# Patient Record
Sex: FEMALE | Race: OTHER | HISPANIC OR LATINO
[De-identification: names, ages, dates, MRNs, and addresses within clinical notes are randomized per-mention and may not be internally consistent; named-entity substitution may affect disease eponyms.]

---

## 2019-02-04 PROBLEM — Z00.00 ENCOUNTER FOR PREVENTIVE HEALTH EXAMINATION: Status: ACTIVE | Noted: 2019-02-04

## 2019-02-09 ENCOUNTER — APPOINTMENT (OUTPATIENT)
Dept: CT IMAGING | Facility: IMAGING CENTER | Age: 39
End: 2019-02-09
Payer: COMMERCIAL

## 2019-02-09 ENCOUNTER — OUTPATIENT (OUTPATIENT)
Dept: OUTPATIENT SERVICES | Facility: HOSPITAL | Age: 39
LOS: 1 days | End: 2019-02-09
Payer: COMMERCIAL

## 2019-02-09 DIAGNOSIS — Z00.8 ENCOUNTER FOR OTHER GENERAL EXAMINATION: ICD-10-CM

## 2019-02-09 PROCEDURE — 70486 CT MAXILLOFACIAL W/O DYE: CPT | Mod: 26

## 2019-02-09 PROCEDURE — 70486 CT MAXILLOFACIAL W/O DYE: CPT

## 2019-03-25 VITALS
RESPIRATION RATE: 19 BRPM | HEART RATE: 68 BPM | OXYGEN SATURATION: 91 % | HEIGHT: 61 IN | DIASTOLIC BLOOD PRESSURE: 70 MMHG | WEIGHT: 124 LBS | SYSTOLIC BLOOD PRESSURE: 120 MMHG | TEMPERATURE: 97.7 F | BODY MASS INDEX: 23.41 KG/M2

## 2020-10-01 DIAGNOSIS — Z01.818 ENCOUNTER FOR OTHER PREPROCEDURAL EXAMINATION: ICD-10-CM

## 2020-10-02 ENCOUNTER — APPOINTMENT (OUTPATIENT)
Dept: DISASTER EMERGENCY | Facility: CLINIC | Age: 40
End: 2020-10-02

## 2020-10-03 LAB — SARS-COV-2 N GENE NPH QL NAA+PROBE: NOT DETECTED

## 2020-10-04 ENCOUNTER — TRANSCRIPTION ENCOUNTER (OUTPATIENT)
Age: 40
End: 2020-10-04

## 2020-10-05 ENCOUNTER — OUTPATIENT (OUTPATIENT)
Dept: OUTPATIENT SERVICES | Facility: HOSPITAL | Age: 40
LOS: 1 days | Discharge: HOME | End: 2020-10-05

## 2020-10-06 ENCOUNTER — TRANSCRIPTION ENCOUNTER (OUTPATIENT)
Age: 40
End: 2020-10-06

## 2022-04-30 ENCOUNTER — INPATIENT (INPATIENT)
Facility: HOSPITAL | Age: 42
LOS: 2 days | Discharge: HOME | DRG: 661 | End: 2022-05-03
Attending: UROLOGY | Admitting: UROLOGY
Payer: COMMERCIAL

## 2022-04-30 VITALS
HEART RATE: 72 BPM | RESPIRATION RATE: 24 BRPM | OXYGEN SATURATION: 100 % | WEIGHT: 130.51 LBS | SYSTOLIC BLOOD PRESSURE: 158 MMHG | DIASTOLIC BLOOD PRESSURE: 93 MMHG

## 2022-04-30 DIAGNOSIS — N20.1 CALCULUS OF URETER: ICD-10-CM

## 2022-04-30 DIAGNOSIS — N20.0 CALCULUS OF KIDNEY: ICD-10-CM

## 2022-04-30 LAB
ALBUMIN SERPL ELPH-MCNC: 3.5 G/DL — SIGNIFICANT CHANGE UP (ref 3.5–5)
ALP SERPL-CCNC: 52 U/L — SIGNIFICANT CHANGE UP (ref 40–120)
ALT FLD-CCNC: 20 U/L DA — SIGNIFICANT CHANGE UP (ref 10–60)
ANION GAP SERPL CALC-SCNC: 9 MMOL/L — SIGNIFICANT CHANGE UP (ref 5–17)
APPEARANCE UR: CLEAR — SIGNIFICANT CHANGE UP
AST SERPL-CCNC: 14 U/L — SIGNIFICANT CHANGE UP (ref 10–40)
BACTERIA # UR AUTO: ABNORMAL /HPF
BASOPHILS # BLD AUTO: 0.05 K/UL — SIGNIFICANT CHANGE UP (ref 0–0.2)
BASOPHILS NFR BLD AUTO: 0.5 % — SIGNIFICANT CHANGE UP (ref 0–2)
BILIRUB SERPL-MCNC: 0.3 MG/DL — SIGNIFICANT CHANGE UP (ref 0.2–1.2)
BILIRUB UR-MCNC: NEGATIVE — SIGNIFICANT CHANGE UP
BUN SERPL-MCNC: 10 MG/DL — SIGNIFICANT CHANGE UP (ref 7–18)
CALCIUM SERPL-MCNC: 9.2 MG/DL — SIGNIFICANT CHANGE UP (ref 8.4–10.5)
CHLORIDE SERPL-SCNC: 109 MMOL/L — HIGH (ref 96–108)
CO2 SERPL-SCNC: 23 MMOL/L — SIGNIFICANT CHANGE UP (ref 22–31)
COLOR SPEC: YELLOW — SIGNIFICANT CHANGE UP
COMMENT - URINE: SIGNIFICANT CHANGE UP
CREAT SERPL-MCNC: 0.87 MG/DL — SIGNIFICANT CHANGE UP (ref 0.5–1.3)
DIFF PNL FLD: ABNORMAL
EGFR: 85 ML/MIN/1.73M2 — SIGNIFICANT CHANGE UP
EOSINOPHIL # BLD AUTO: 0.08 K/UL — SIGNIFICANT CHANGE UP (ref 0–0.5)
EOSINOPHIL NFR BLD AUTO: 0.8 % — SIGNIFICANT CHANGE UP (ref 0–6)
EPI CELLS # UR: SIGNIFICANT CHANGE UP /HPF
GLUCOSE SERPL-MCNC: 126 MG/DL — HIGH (ref 70–99)
GLUCOSE UR QL: NEGATIVE — SIGNIFICANT CHANGE UP
GRAN CASTS # UR COMP ASSIST: ABNORMAL /LPF
HCG SERPL-ACNC: <1 MIU/ML — SIGNIFICANT CHANGE UP
HCT VFR BLD CALC: 36.6 % — SIGNIFICANT CHANGE UP (ref 34.5–45)
HGB BLD-MCNC: 12.4 G/DL — SIGNIFICANT CHANGE UP (ref 11.5–15.5)
IMM GRANULOCYTES NFR BLD AUTO: 0.4 % — SIGNIFICANT CHANGE UP (ref 0–1.5)
KETONES UR-MCNC: ABNORMAL
LACTATE SERPL-SCNC: 3.3 MMOL/L — HIGH (ref 0.7–2)
LEUKOCYTE ESTERASE UR-ACNC: NEGATIVE — SIGNIFICANT CHANGE UP
LIDOCAIN IGE QN: 111 U/L — SIGNIFICANT CHANGE UP (ref 73–393)
LYMPHOCYTES # BLD AUTO: 1.64 K/UL — SIGNIFICANT CHANGE UP (ref 1–3.3)
LYMPHOCYTES # BLD AUTO: 16.6 % — SIGNIFICANT CHANGE UP (ref 13–44)
MCHC RBC-ENTMCNC: 29.9 PG — SIGNIFICANT CHANGE UP (ref 27–34)
MCHC RBC-ENTMCNC: 33.9 GM/DL — SIGNIFICANT CHANGE UP (ref 32–36)
MCV RBC AUTO: 88.2 FL — SIGNIFICANT CHANGE UP (ref 80–100)
MONOCYTES # BLD AUTO: 0.54 K/UL — SIGNIFICANT CHANGE UP (ref 0–0.9)
MONOCYTES NFR BLD AUTO: 5.5 % — SIGNIFICANT CHANGE UP (ref 2–14)
NEUTROPHILS # BLD AUTO: 7.54 K/UL — HIGH (ref 1.8–7.4)
NEUTROPHILS NFR BLD AUTO: 76.2 % — SIGNIFICANT CHANGE UP (ref 43–77)
NITRITE UR-MCNC: NEGATIVE — SIGNIFICANT CHANGE UP
NRBC # BLD: 0 /100 WBCS — SIGNIFICANT CHANGE UP (ref 0–0)
PH UR: 8 — SIGNIFICANT CHANGE UP (ref 5–8)
PLATELET # BLD AUTO: 258 K/UL — SIGNIFICANT CHANGE UP (ref 150–400)
POTASSIUM SERPL-MCNC: 3.2 MMOL/L — LOW (ref 3.5–5.3)
POTASSIUM SERPL-SCNC: 3.2 MMOL/L — LOW (ref 3.5–5.3)
PROT SERPL-MCNC: 7.3 G/DL — SIGNIFICANT CHANGE UP (ref 6–8.3)
PROT UR-MCNC: NEGATIVE — SIGNIFICANT CHANGE UP
RBC # BLD: 4.15 M/UL — SIGNIFICANT CHANGE UP (ref 3.8–5.2)
RBC # FLD: 12.1 % — SIGNIFICANT CHANGE UP (ref 10.3–14.5)
RBC CASTS # UR COMP ASSIST: ABNORMAL /HPF (ref 0–2)
SARS-COV-2 RNA SPEC QL NAA+PROBE: SIGNIFICANT CHANGE UP
SODIUM SERPL-SCNC: 141 MMOL/L — SIGNIFICANT CHANGE UP (ref 135–145)
SP GR SPEC: 1.01 — SIGNIFICANT CHANGE UP (ref 1.01–1.02)
UROBILINOGEN FLD QL: NEGATIVE — SIGNIFICANT CHANGE UP
WBC # BLD: 9.89 K/UL — SIGNIFICANT CHANGE UP (ref 3.8–10.5)
WBC # FLD AUTO: 9.89 K/UL — SIGNIFICANT CHANGE UP (ref 3.8–10.5)
WBC UR QL: SIGNIFICANT CHANGE UP /HPF (ref 0–5)

## 2022-04-30 PROCEDURE — 99221 1ST HOSP IP/OBS SF/LOW 40: CPT | Mod: GC

## 2022-04-30 PROCEDURE — 76830 TRANSVAGINAL US NON-OB: CPT | Mod: 26

## 2022-04-30 PROCEDURE — 93975 VASCULAR STUDY: CPT | Mod: 26

## 2022-04-30 PROCEDURE — 76856 US EXAM PELVIC COMPLETE: CPT | Mod: 26

## 2022-04-30 PROCEDURE — 99284 EMERGENCY DEPT VISIT MOD MDM: CPT

## 2022-04-30 PROCEDURE — 74176 CT ABD & PELVIS W/O CONTRAST: CPT | Mod: 26,MA

## 2022-04-30 RX ORDER — POTASSIUM CHLORIDE 20 MEQ
10 PACKET (EA) ORAL ONCE
Refills: 0 | Status: COMPLETED | OUTPATIENT
Start: 2022-04-30 | End: 2022-04-30

## 2022-04-30 RX ORDER — SODIUM CHLORIDE 9 MG/ML
3 INJECTION INTRAMUSCULAR; INTRAVENOUS; SUBCUTANEOUS EVERY 8 HOURS
Refills: 0 | Status: DISCONTINUED | OUTPATIENT
Start: 2022-04-30 | End: 2022-05-03

## 2022-04-30 RX ORDER — SODIUM CHLORIDE 9 MG/ML
1000 INJECTION INTRAMUSCULAR; INTRAVENOUS; SUBCUTANEOUS ONCE
Refills: 0 | Status: COMPLETED | OUTPATIENT
Start: 2022-04-30 | End: 2022-04-30

## 2022-04-30 RX ORDER — DEXTROSE MONOHYDRATE, SODIUM CHLORIDE, AND POTASSIUM CHLORIDE 50; .745; 4.5 G/1000ML; G/1000ML; G/1000ML
1000 INJECTION, SOLUTION INTRAVENOUS
Refills: 0 | Status: DISCONTINUED | OUTPATIENT
Start: 2022-04-30 | End: 2022-05-03

## 2022-04-30 RX ORDER — MORPHINE SULFATE 50 MG/1
4 CAPSULE, EXTENDED RELEASE ORAL ONCE
Refills: 0 | Status: DISCONTINUED | OUTPATIENT
Start: 2022-04-30 | End: 2022-04-30

## 2022-04-30 RX ORDER — ONDANSETRON 8 MG/1
4 TABLET, FILM COATED ORAL EVERY 6 HOURS
Refills: 0 | Status: DISCONTINUED | OUTPATIENT
Start: 2022-04-30 | End: 2022-05-03

## 2022-04-30 RX ORDER — ONDANSETRON 8 MG/1
4 TABLET, FILM COATED ORAL ONCE
Refills: 0 | Status: COMPLETED | OUTPATIENT
Start: 2022-04-30 | End: 2022-04-30

## 2022-04-30 RX ORDER — KETOROLAC TROMETHAMINE 30 MG/ML
30 SYRINGE (ML) INJECTION EVERY 6 HOURS
Refills: 0 | Status: DISCONTINUED | OUTPATIENT
Start: 2022-04-30 | End: 2022-05-01

## 2022-04-30 RX ORDER — KETOROLAC TROMETHAMINE 30 MG/ML
15 SYRINGE (ML) INJECTION ONCE
Refills: 0 | Status: DISCONTINUED | OUTPATIENT
Start: 2022-04-30 | End: 2022-04-30

## 2022-04-30 RX ORDER — ACETAMINOPHEN 500 MG
1000 TABLET ORAL ONCE
Refills: 0 | Status: COMPLETED | OUTPATIENT
Start: 2022-04-30 | End: 2022-04-30

## 2022-04-30 RX ORDER — TAMSULOSIN HYDROCHLORIDE 0.4 MG/1
0.4 CAPSULE ORAL ONCE
Refills: 0 | Status: COMPLETED | OUTPATIENT
Start: 2022-04-30 | End: 2022-04-30

## 2022-04-30 RX ORDER — TAMSULOSIN HYDROCHLORIDE 0.4 MG/1
0.4 CAPSULE ORAL AT BEDTIME
Refills: 0 | Status: DISCONTINUED | OUTPATIENT
Start: 2022-05-01 | End: 2022-05-03

## 2022-04-30 RX ADMIN — TAMSULOSIN HYDROCHLORIDE 0.4 MILLIGRAM(S): 0.4 CAPSULE ORAL at 11:38

## 2022-04-30 RX ADMIN — Medication 400 MILLIGRAM(S): at 11:39

## 2022-04-30 RX ADMIN — SODIUM CHLORIDE 1000 MILLILITER(S): 9 INJECTION INTRAMUSCULAR; INTRAVENOUS; SUBCUTANEOUS at 05:32

## 2022-04-30 RX ADMIN — Medication 15 MILLIGRAM(S): at 05:12

## 2022-04-30 RX ADMIN — MORPHINE SULFATE 4 MILLIGRAM(S): 50 CAPSULE, EXTENDED RELEASE ORAL at 07:00

## 2022-04-30 RX ADMIN — MORPHINE SULFATE 4 MILLIGRAM(S): 50 CAPSULE, EXTENDED RELEASE ORAL at 05:32

## 2022-04-30 RX ADMIN — Medication 10 MILLIEQUIVALENT(S): at 07:12

## 2022-04-30 RX ADMIN — MORPHINE SULFATE 4 MILLIGRAM(S): 50 CAPSULE, EXTENDED RELEASE ORAL at 09:29

## 2022-04-30 RX ADMIN — Medication 30 MILLIGRAM(S): at 17:41

## 2022-04-30 RX ADMIN — MORPHINE SULFATE 4 MILLIGRAM(S): 50 CAPSULE, EXTENDED RELEASE ORAL at 08:59

## 2022-04-30 RX ADMIN — Medication 15 MILLIGRAM(S): at 04:55

## 2022-04-30 RX ADMIN — SODIUM CHLORIDE 3 MILLILITER(S): 9 INJECTION INTRAMUSCULAR; INTRAVENOUS; SUBCUTANEOUS at 21:10

## 2022-04-30 RX ADMIN — ONDANSETRON 4 MILLIGRAM(S): 8 TABLET, FILM COATED ORAL at 05:32

## 2022-04-30 RX ADMIN — MORPHINE SULFATE 4 MILLIGRAM(S): 50 CAPSULE, EXTENDED RELEASE ORAL at 06:12

## 2022-04-30 RX ADMIN — SODIUM CHLORIDE 1000 MILLILITER(S): 9 INJECTION INTRAMUSCULAR; INTRAVENOUS; SUBCUTANEOUS at 04:55

## 2022-04-30 RX ADMIN — DEXTROSE MONOHYDRATE, SODIUM CHLORIDE, AND POTASSIUM CHLORIDE 125 MILLILITER(S): 50; .745; 4.5 INJECTION, SOLUTION INTRAVENOUS at 16:28

## 2022-04-30 RX ADMIN — Medication 1000 MILLIGRAM(S): at 21:12

## 2022-04-30 RX ADMIN — Medication 30 MILLIGRAM(S): at 17:11

## 2022-04-30 RX ADMIN — Medication 1000 MILLIGRAM(S): at 00:00

## 2022-04-30 RX ADMIN — MORPHINE SULFATE 4 MILLIGRAM(S): 50 CAPSULE, EXTENDED RELEASE ORAL at 05:44

## 2022-04-30 RX ADMIN — Medication 100 MILLIEQUIVALENT(S): at 06:12

## 2022-04-30 RX ADMIN — ONDANSETRON 4 MILLIGRAM(S): 8 TABLET, FILM COATED ORAL at 20:33

## 2022-04-30 RX ADMIN — Medication 400 MILLIGRAM(S): at 20:37

## 2022-04-30 RX ADMIN — SODIUM CHLORIDE 3 MILLILITER(S): 9 INJECTION INTRAMUSCULAR; INTRAVENOUS; SUBCUTANEOUS at 05:32

## 2022-04-30 RX ADMIN — SODIUM CHLORIDE 3 MILLILITER(S): 9 INJECTION INTRAMUSCULAR; INTRAVENOUS; SUBCUTANEOUS at 14:19

## 2022-04-30 RX ADMIN — DEXTROSE MONOHYDRATE, SODIUM CHLORIDE, AND POTASSIUM CHLORIDE 125 MILLILITER(S): 50; .745; 4.5 INJECTION, SOLUTION INTRAVENOUS at 17:14

## 2022-04-30 NOTE — H&P ADULT - ASSESSMENT
40 y/o female denies sig PMH or PSH  c/o LLQ pain radiating to L flank since yesterday  denies f/c/n/v  pain worsened overnight  urinating without difficulty    CT with L mid ureteral 7mm stone with L perinephric fluid, mod hydro  pt urinating freely  labs wnl  pain still present  no fever or leukocytosis, HD stable

## 2022-04-30 NOTE — ED PROVIDER NOTE - PROGRESS NOTE DETAILS
Vick - SO received from Dr Mckeon. Pt evaluated at bedside, pain is recurring to LLQ, no TTP but positive L CVAT. UA w hematuria (pt not on her period). US not reported but likely kidney stone. Will get CT non-con, will redose pain meds, likely Urology consult if stx persist Nemes - Rads called w 7mm mid ureter stone w hydronephrosis and hydroureter, perinephric stranding and fluid. Also, incidental findings of L adnexal dermoid cyst and liver mass vs hemangioma. Pt aware of CT findings. D/w Dr Howard, will see pt at bedside shortly Vick - Dr Howard in the ED, accepted patient for Dr Sher's service. Copy of CT report given to patient, incidental findings reviewed

## 2022-04-30 NOTE — H&P ADULT - NSHPPHYSICALEXAM_GEN_ALL_CORE
T(C): 36.7 (04-30-22 @ 07:36), Max: 36.7 (04-30-22 @ 07:36)  HR: 66 (04-30-22 @ 07:36) (66 - 72)  BP: 108/69 (04-30-22 @ 07:36) (108/69 - 158/93)  RR: 19 (04-30-22 @ 07:36) (19 - 24)  SpO2: 100% (04-30-22 @ 07:36) (100% - 100%)    CONSTITUTIONAL: Well groomed, no apparent distress  EYES: PERRLA and symmetric, EOMI, No conjunctival or scleral injection, non-icteric  NECK: Supple, symmetric and without tracheal deviation  RESPIRATORY: No respiratory distress, no use of accessory muscles; CTA b/l, no wheezes, rales or rhonchi  CARDIOVASCULAR: RRRR, +S1S2, no murmurs, no rubs, no gallops  GASTROINTESTINAL: Soft, mildly tender LLQ, non distended, no rebound, no guarding; mild L CVA tenderness  MUSCULOSKELETAL: Normal gait and station; no digital clubbing or cyanosis  SKIN: No rashes or ulcers noted; no subcutaneous nodules or induration palpable  PSYCHIATRIC: Appropriate insight/judgment; A+O x 3, mood and affect appropriate, recent/remote memory intact

## 2022-04-30 NOTE — ED PROVIDER NOTE - CLINICAL SUMMARY MEDICAL DECISION MAKING FREE TEXT BOX
Pt p/w sudden onset severe L lower ABD/pelvic pain with N/V. Must r/o torsion, U/S team called in STAT. Labs showing lactate 3.3. Pt currently in U/S. Pt stable. Will reassess.

## 2022-04-30 NOTE — H&P ADULT - PROBLEM SELECTOR PLAN 1
will admit, hydrate, pain control, flomax, serial abd exams, poss cysto/stent if no improvement, am labs, npo p mn  discussed with Dr Sher

## 2022-04-30 NOTE — H&P ADULT - HISTORY OF PRESENT ILLNESS
40 y/o female denies sig PMH or PSH  c/o LLQ pain radiating to L flank since yesterday  denies f/c/n/v  pain worsened overnight  urinating without difficulty    < from: CT Abdomen and Pelvis No Cont (04.30.22 @ 09:49) >  FINDINGS:  LOWER CHEST: Bibasilar linear atelectasis.    LIVER: Indeterminate hypodense mass in the left lobe measuring 4.3 x 3.9   cm (2:24). Additional subcentimeter hypodense foci are noted in the right   lobe.  BILE DUCTS: Normal caliber.  GALLBLADDER: Within normal limits.  SPLEEN: Within normal limits.  PANCREAS: Within normal limits.  ADRENALS: Within normal limits.  KIDNEYS/URETERS: Moderate left hydroureteronephrosis, secondary to a 0.7   x 0.5 cm calculus in the mid left ureter. There is moderate associated   left perinephric fluid.    BLADDER: Within normal limits.  REPRODUCTIVE ORGANS: A 3.4 x 3.3 cm left adnexal dermoid is present   (2:103). There is an associated calcification with the mass.    BOWEL: No bowel obstruction. Appendix is within normal limits.  PERITONEUM: Trace ascites.  VESSELS: Within normal limits.  RETROPERITONEUM/LYMPH NODES: No lymphadenopathy.  ABDOMINALWALL: Small fat containing umbilical hernia.  BONES: Within normal limits.    IMPRESSION:    Moderate left hydroureteronephrosis, secondary to a 0.7 x 0.5 cm calculus   in the mid left ureter. There is moderate associated left perinephric   fluid.    Indeterminate 4.3 x 3.9 cm hypodense mass in the left lobe of the liver.   Contrast-enhanced MRI is suggested for further characterization.    A 3.4 x 3.3 cm left adnexal dermoid is seen.    < end of copied text >

## 2022-04-30 NOTE — H&P ADULT - ATTENDING COMMENTS
42 y/o female denies sig PMH or PSH  c/o LLQ pain radiating to L flank since yesterday  denies f/c/n/v  pain worsened overnight  urinating without difficulty    CT with L mid ureteral 7mm stone with L perinephric fluid, mod hydro  pt urinating freely  labs wnl  pain still present  no fever or leukocytosis, HD stable    will admit, hydrate, pain control, flomax, serial abd exams, poss cysto/stent if no improvement, am labs, npo p mn

## 2022-04-30 NOTE — ED PROVIDER NOTE - PHYSICAL EXAMINATION
Vital Signs Reviewed  GEN: Very uncomfortable, AAOx3  HEENT: NCAT, MMM, Neck Supple  RESP: CTAB, No rales/rhonchi/wheezing  CV: RRR, S1S2, No murmurs  ABD: LLQ and L pelvic TTP, Soft, ND, No masses, No CVA Tenderness  Extrem/Skin: Equal pulses bilat, No cyanosis/edema/rashes  Neuro: No focal deficits

## 2022-04-30 NOTE — PATIENT PROFILE ADULT - FALL HARM RISK - UNIVERSAL INTERVENTIONS
Bed in lowest position, wheels locked, appropriate side rails in place/Call bell, personal items and telephone in reach/Instruct patient to call for assistance before getting out of bed or chair/Non-slip footwear when patient is out of bed/[place] to call system/Physically safe environment - no spills, clutter or unnecessary equipment/Purposeful Proactive Rounding/Room/bathroom lighting operational, light cord in reach

## 2022-04-30 NOTE — ED PROVIDER NOTE - OBJECTIVE STATEMENT
42 yo F h/o ovarian cyst (unknown side), on OCPs, no other PMH, no ABD surg p/w severe LLQ and pelvic pain with N/V x several hours. Pt states that she's had 1 week of mild LLQ pain with constipation, had nml BM yesterday, and then woke up at 2am with severe LLQ/L pelvic pain. Pt denies associated Vaginal Bleeding or Discharge, Bloody or Black Stools, Diarrhea, Dysuria or other Urinary symptoms, Fever, Syncope, Chest Pain, SOB, Focal Numbness/Weakness. No h/o ABD Surgeries/ Hernias/Ulcers. No other recent illness/ hospitalizations.

## 2022-05-01 ENCOUNTER — TRANSCRIPTION ENCOUNTER (OUTPATIENT)
Age: 42
End: 2022-05-01

## 2022-05-01 LAB
ANION GAP SERPL CALC-SCNC: 7 MMOL/L — SIGNIFICANT CHANGE UP (ref 5–17)
BUN SERPL-MCNC: 5 MG/DL — LOW (ref 7–18)
CALCIUM SERPL-MCNC: 8.4 MG/DL — SIGNIFICANT CHANGE UP (ref 8.4–10.5)
CHLORIDE SERPL-SCNC: 111 MMOL/L — HIGH (ref 96–108)
CO2 SERPL-SCNC: 24 MMOL/L — SIGNIFICANT CHANGE UP (ref 22–31)
CREAT SERPL-MCNC: 0.8 MG/DL — SIGNIFICANT CHANGE UP (ref 0.5–1.3)
EGFR: 94 ML/MIN/1.73M2 — SIGNIFICANT CHANGE UP
GLUCOSE SERPL-MCNC: 104 MG/DL — HIGH (ref 70–99)
HCT VFR BLD CALC: 31.6 % — LOW (ref 34.5–45)
HGB BLD-MCNC: 10.4 G/DL — LOW (ref 11.5–15.5)
MCHC RBC-ENTMCNC: 30.2 PG — SIGNIFICANT CHANGE UP (ref 27–34)
MCHC RBC-ENTMCNC: 32.9 GM/DL — SIGNIFICANT CHANGE UP (ref 32–36)
MCV RBC AUTO: 91.9 FL — SIGNIFICANT CHANGE UP (ref 80–100)
NRBC # BLD: 0 /100 WBCS — SIGNIFICANT CHANGE UP (ref 0–0)
PLATELET # BLD AUTO: 169 K/UL — SIGNIFICANT CHANGE UP (ref 150–400)
POTASSIUM SERPL-MCNC: 3.6 MMOL/L — SIGNIFICANT CHANGE UP (ref 3.5–5.3)
POTASSIUM SERPL-SCNC: 3.6 MMOL/L — SIGNIFICANT CHANGE UP (ref 3.5–5.3)
RBC # BLD: 3.44 M/UL — LOW (ref 3.8–5.2)
RBC # FLD: 12.5 % — SIGNIFICANT CHANGE UP (ref 10.3–14.5)
SODIUM SERPL-SCNC: 142 MMOL/L — SIGNIFICANT CHANGE UP (ref 135–145)
WBC # BLD: 6.46 K/UL — SIGNIFICANT CHANGE UP (ref 3.8–10.5)
WBC # FLD AUTO: 6.46 K/UL — SIGNIFICANT CHANGE UP (ref 3.8–10.5)

## 2022-05-01 RX ORDER — TAMSULOSIN HYDROCHLORIDE 0.4 MG/1
0.4 CAPSULE ORAL ONCE
Refills: 0 | Status: COMPLETED | OUTPATIENT
Start: 2022-05-01 | End: 2022-05-01

## 2022-05-01 RX ORDER — KETOROLAC TROMETHAMINE 30 MG/ML
15 SYRINGE (ML) INJECTION EVERY 6 HOURS
Refills: 0 | Status: DISCONTINUED | OUTPATIENT
Start: 2022-05-01 | End: 2022-05-03

## 2022-05-01 RX ORDER — SENNA PLUS 8.6 MG/1
2 TABLET ORAL AT BEDTIME
Refills: 0 | Status: DISCONTINUED | OUTPATIENT
Start: 2022-05-01 | End: 2022-05-03

## 2022-05-01 RX ORDER — CEFTRIAXONE 500 MG/1
1000 INJECTION, POWDER, FOR SOLUTION INTRAMUSCULAR; INTRAVENOUS ONCE
Refills: 0 | Status: COMPLETED | OUTPATIENT
Start: 2022-05-01 | End: 2022-05-01

## 2022-05-01 RX ORDER — POLYETHYLENE GLYCOL 3350 17 G/17G
17 POWDER, FOR SOLUTION ORAL ONCE
Refills: 0 | Status: COMPLETED | OUTPATIENT
Start: 2022-05-01 | End: 2022-05-02

## 2022-05-01 RX ORDER — CEFTRIAXONE 500 MG/1
INJECTION, POWDER, FOR SOLUTION INTRAMUSCULAR; INTRAVENOUS
Refills: 0 | Status: DISCONTINUED | OUTPATIENT
Start: 2022-05-01 | End: 2022-05-03

## 2022-05-01 RX ORDER — CEFTRIAXONE 500 MG/1
1000 INJECTION, POWDER, FOR SOLUTION INTRAMUSCULAR; INTRAVENOUS EVERY 24 HOURS
Refills: 0 | Status: DISCONTINUED | OUTPATIENT
Start: 2022-05-02 | End: 2022-05-03

## 2022-05-01 RX ORDER — KETOROLAC TROMETHAMINE 30 MG/ML
30 SYRINGE (ML) INJECTION ONCE
Refills: 0 | Status: DISCONTINUED | OUTPATIENT
Start: 2022-05-01 | End: 2022-05-01

## 2022-05-01 RX ADMIN — DEXTROSE MONOHYDRATE, SODIUM CHLORIDE, AND POTASSIUM CHLORIDE 125 MILLILITER(S): 50; .745; 4.5 INJECTION, SOLUTION INTRAVENOUS at 03:07

## 2022-05-01 RX ADMIN — TAMSULOSIN HYDROCHLORIDE 0.4 MILLIGRAM(S): 0.4 CAPSULE ORAL at 22:27

## 2022-05-01 RX ADMIN — SODIUM CHLORIDE 3 MILLILITER(S): 9 INJECTION INTRAMUSCULAR; INTRAVENOUS; SUBCUTANEOUS at 05:20

## 2022-05-01 RX ADMIN — SENNA PLUS 2 TABLET(S): 8.6 TABLET ORAL at 22:27

## 2022-05-01 RX ADMIN — ONDANSETRON 4 MILLIGRAM(S): 8 TABLET, FILM COATED ORAL at 09:53

## 2022-05-01 RX ADMIN — CEFTRIAXONE 100 MILLIGRAM(S): 500 INJECTION, POWDER, FOR SOLUTION INTRAMUSCULAR; INTRAVENOUS at 22:33

## 2022-05-01 RX ADMIN — Medication 30 MILLIGRAM(S): at 18:14

## 2022-05-01 RX ADMIN — Medication 30 MILLIGRAM(S): at 04:10

## 2022-05-01 RX ADMIN — DEXTROSE MONOHYDRATE, SODIUM CHLORIDE, AND POTASSIUM CHLORIDE 125 MILLILITER(S): 50; .745; 4.5 INJECTION, SOLUTION INTRAVENOUS at 22:25

## 2022-05-01 RX ADMIN — Medication 30 MILLIGRAM(S): at 11:08

## 2022-05-01 RX ADMIN — Medication 30 MILLIGRAM(S): at 07:48

## 2022-05-01 RX ADMIN — Medication 30 MILLIGRAM(S): at 03:10

## 2022-05-01 RX ADMIN — TAMSULOSIN HYDROCHLORIDE 0.4 MILLIGRAM(S): 0.4 CAPSULE ORAL at 07:52

## 2022-05-01 RX ADMIN — SODIUM CHLORIDE 3 MILLILITER(S): 9 INJECTION INTRAMUSCULAR; INTRAVENOUS; SUBCUTANEOUS at 22:30

## 2022-05-01 RX ADMIN — Medication 15 MILLIGRAM(S): at 19:26

## 2022-05-01 NOTE — PROGRESS NOTE ADULT - NS ATTEND AMEND GEN_ALL_CORE FT
41 yoF with L mid ureteral stone    UA negative for uti, RBCs  afeb, vss    - pain control  - OR planning tmrw for cysto/stent, lithotripsy  - dvt/gi ppx

## 2022-05-02 ENCOUNTER — TRANSCRIPTION ENCOUNTER (OUTPATIENT)
Age: 42
End: 2022-05-02

## 2022-05-02 ENCOUNTER — RESULT REVIEW (OUTPATIENT)
Age: 42
End: 2022-05-02

## 2022-05-02 LAB
ANION GAP SERPL CALC-SCNC: 7 MMOL/L — SIGNIFICANT CHANGE UP (ref 5–17)
BLD GP AB SCN SERPL QL: SIGNIFICANT CHANGE UP
BUN SERPL-MCNC: 5 MG/DL — LOW (ref 7–18)
CALCIUM SERPL-MCNC: 9 MG/DL — SIGNIFICANT CHANGE UP (ref 8.4–10.5)
CHLORIDE SERPL-SCNC: 106 MMOL/L — SIGNIFICANT CHANGE UP (ref 96–108)
CO2 SERPL-SCNC: 24 MMOL/L — SIGNIFICANT CHANGE UP (ref 22–31)
CREAT SERPL-MCNC: 0.9 MG/DL — SIGNIFICANT CHANGE UP (ref 0.5–1.3)
EGFR: 82 ML/MIN/1.73M2 — SIGNIFICANT CHANGE UP
GLUCOSE SERPL-MCNC: 125 MG/DL — HIGH (ref 70–99)
HCT VFR BLD CALC: 37.4 % — SIGNIFICANT CHANGE UP (ref 34.5–45)
HGB BLD-MCNC: 12.4 G/DL — SIGNIFICANT CHANGE UP (ref 11.5–15.5)
MCHC RBC-ENTMCNC: 30.1 PG — SIGNIFICANT CHANGE UP (ref 27–34)
MCHC RBC-ENTMCNC: 33.2 GM/DL — SIGNIFICANT CHANGE UP (ref 32–36)
MCV RBC AUTO: 90.8 FL — SIGNIFICANT CHANGE UP (ref 80–100)
NRBC # BLD: 0 /100 WBCS — SIGNIFICANT CHANGE UP (ref 0–0)
PLATELET # BLD AUTO: 209 K/UL — SIGNIFICANT CHANGE UP (ref 150–400)
POTASSIUM SERPL-MCNC: 3.8 MMOL/L — SIGNIFICANT CHANGE UP (ref 3.5–5.3)
POTASSIUM SERPL-SCNC: 3.8 MMOL/L — SIGNIFICANT CHANGE UP (ref 3.5–5.3)
RBC # BLD: 4.12 M/UL — SIGNIFICANT CHANGE UP (ref 3.8–5.2)
RBC # FLD: 12.2 % — SIGNIFICANT CHANGE UP (ref 10.3–14.5)
SODIUM SERPL-SCNC: 137 MMOL/L — SIGNIFICANT CHANGE UP (ref 135–145)
WBC # BLD: 8.71 K/UL — SIGNIFICANT CHANGE UP (ref 3.8–10.5)
WBC # FLD AUTO: 8.71 K/UL — SIGNIFICANT CHANGE UP (ref 3.8–10.5)

## 2022-05-02 PROCEDURE — 74420 UROGRAPHY RTRGR +-KUB: CPT | Mod: 26,LT

## 2022-05-02 PROCEDURE — 52356 CYSTO/URETERO W/LITHOTRIPSY: CPT | Mod: LT

## 2022-05-02 PROCEDURE — 88300 SURGICAL PATH GROSS: CPT | Mod: 26

## 2022-05-02 DEVICE — CATH URET 5FR 70CM: Type: IMPLANTABLE DEVICE | Site: LEFT | Status: FUNCTIONAL

## 2022-05-02 DEVICE — STENT URET PERCFLX PLUS 6F 2MM 24CM: Type: IMPLANTABLE DEVICE | Site: LEFT | Status: FUNCTIONAL

## 2022-05-02 DEVICE — GWIRE SENS NIT 0.035INX150CM: Type: IMPLANTABLE DEVICE | Site: LEFT | Status: FUNCTIONAL

## 2022-05-02 DEVICE — LASER FIBER SOLTIVE 200 BALL TIP: Type: IMPLANTABLE DEVICE | Site: LEFT | Status: FUNCTIONAL

## 2022-05-02 DEVICE — KIT URET PERFLX PLUS 6FRX22CM: Type: IMPLANTABLE DEVICE | Site: LEFT | Status: FUNCTIONAL

## 2022-05-02 DEVICE — URETERAL SHEATH NAVIGATOR HD 12/14FR X 28CM: Type: IMPLANTABLE DEVICE | Site: LEFT | Status: FUNCTIONAL

## 2022-05-02 DEVICE — CATH URET STONE DISPLC DL 10FR: Type: IMPLANTABLE DEVICE | Site: LEFT | Status: FUNCTIONAL

## 2022-05-02 DEVICE — BASKET ZEROTIP NITINOL 1.9FR 120CM X 12MM 4 WIRES: Type: IMPLANTABLE DEVICE | Site: LEFT | Status: FUNCTIONAL

## 2022-05-02 DEVICE — GUIDEWIRE AMPLATZ SUPER STIFF .038X260CM: Type: IMPLANTABLE DEVICE | Site: LEFT | Status: FUNCTIONAL

## 2022-05-02 RX ORDER — FENTANYL CITRATE 50 UG/ML
25 INJECTION INTRAVENOUS
Refills: 0 | Status: DISCONTINUED | OUTPATIENT
Start: 2022-05-02 | End: 2022-05-02

## 2022-05-02 RX ORDER — CEPHALEXIN 500 MG
1 CAPSULE ORAL
Qty: 6 | Refills: 0
Start: 2022-05-02 | End: 2022-05-04

## 2022-05-02 RX ORDER — SODIUM CHLORIDE 9 MG/ML
1000 INJECTION, SOLUTION INTRAVENOUS
Refills: 0 | Status: DISCONTINUED | OUTPATIENT
Start: 2022-05-02 | End: 2022-05-02

## 2022-05-02 RX ORDER — PHENAZOPYRIDINE HCL 100 MG
1 TABLET ORAL
Qty: 15 | Refills: 0
Start: 2022-05-02 | End: 2022-05-06

## 2022-05-02 RX ORDER — FENTANYL CITRATE 50 UG/ML
50 INJECTION INTRAVENOUS
Refills: 0 | Status: DISCONTINUED | OUTPATIENT
Start: 2022-05-02 | End: 2022-05-02

## 2022-05-02 RX ADMIN — ONDANSETRON 4 MILLIGRAM(S): 8 TABLET, FILM COATED ORAL at 00:09

## 2022-05-02 RX ADMIN — Medication 15 MILLIGRAM(S): at 14:54

## 2022-05-02 RX ADMIN — SENNA PLUS 2 TABLET(S): 8.6 TABLET ORAL at 21:08

## 2022-05-02 RX ADMIN — Medication 15 MILLIGRAM(S): at 20:37

## 2022-05-02 RX ADMIN — SODIUM CHLORIDE 3 MILLILITER(S): 9 INJECTION INTRAMUSCULAR; INTRAVENOUS; SUBCUTANEOUS at 05:34

## 2022-05-02 RX ADMIN — TAMSULOSIN HYDROCHLORIDE 0.4 MILLIGRAM(S): 0.4 CAPSULE ORAL at 21:08

## 2022-05-02 RX ADMIN — SODIUM CHLORIDE 3 MILLILITER(S): 9 INJECTION INTRAMUSCULAR; INTRAVENOUS; SUBCUTANEOUS at 21:07

## 2022-05-02 RX ADMIN — Medication 15 MILLIGRAM(S): at 20:23

## 2022-05-02 RX ADMIN — ONDANSETRON 4 MILLIGRAM(S): 8 TABLET, FILM COATED ORAL at 06:27

## 2022-05-02 RX ADMIN — CEFTRIAXONE 100 MILLIGRAM(S): 500 INJECTION, POWDER, FOR SOLUTION INTRAMUSCULAR; INTRAVENOUS at 21:08

## 2022-05-02 RX ADMIN — Medication 15 MILLIGRAM(S): at 01:36

## 2022-05-02 RX ADMIN — POLYETHYLENE GLYCOL 3350 17 GRAM(S): 17 POWDER, FOR SOLUTION ORAL at 21:08

## 2022-05-02 RX ADMIN — Medication 15 MILLIGRAM(S): at 08:04

## 2022-05-02 NOTE — DISCHARGE NOTE PROVIDER - CARE PROVIDER_API CALL
Jacob Sher)  Urology  [address] [facility] [address], [address]  [place] [address]  Phone: [phone]  Fax: [phone]  Follow Up Time: 1 week

## 2022-05-02 NOTE — DISCHARGE NOTE PROVIDER - NSDCCPGOAL_GEN_ALL_CORE_FT
To get better and follow your care plan as instructed.  STENT: You may have an internal stent (a hollow tube that runs from the kidney to your bladder) after your procedure, which helps urine drain from the kidney to your bladder. Some patients experience urinary frequency, burning, or even back pain (especially with urination). These sensations will gradually get better. Increasing your fluid intake can also improve these symptoms. While the stent is in place, your urine may continue to be bloody. This stent is temporary and must be removed by your urologist as an outpatient with in 3 months unless otherwise specified. If your stent is on a string, it is secured to your leg or genitalia with an adhesive bandage. Do not pull on the string, do not remove the bandage, do not insert anything intravaginally/intraurethrally, and do not engage in sexual intercourse until after the stent is removed at your post-operative appointment.  GENERAL: It is common to have blood in your urine after your procedure. It may be pink or even red; inform your doctor if you have a significant amount of clot in the urine or if you are unable to void at all. The urine may clear and then become bloody again especially as you are more physically active.  BATHING: You may shower or bathe.  DIET: You may resume your regular diet and regular medication regimen.  PAIN: You may take Tylenol (acetaminophen) 650-975mg and/or Motrin (ibuprofen) 400-600mg, both available over the counter, for pain every 6 hours as needed. Do not exceed 4000mg of Tylenol (acetaminophen) daily. You may alternate these medications such that you take one or the other every 3 hours for around the clock pain coverage. If you have a stent, the following medications may have been sent to your pharmacy for stent related discomfort: Flomax (tamsulosin) 0.4mg at bedtime until stent removed, Ditropan (oxybutynin) 5mg every 8 hours as needed for bladder spasms, and Pyridium (phenasopyridine) 100mg every 8 hours as needed for kidney/bladder discomfort for max 3 days (Pyridium will make your urine orange).  ANTIBIOTICS: You may be given a prescription for an antibiotic, please take this medication as instructed and be sure to complete the entire course.  STOOL SOFTENERS: Do not allow yourself to become constipated as straining may cause bleeding. Take stool softeners or a laxative (ex. Miralax, Colace, Senokot, ExLax, etc), available over the counter, if needed.  ACTIVITY: No heavy lifting or strenuous exercise until you are evaluated at your post-operative appointment. Otherwise, you may return to your usual level of physical activity.  ANTICOAGULATION: If you are taking any blood thinning medications, please discuss with your urologist prior to restarting these medications unless otherwise specified.  FOLLOW-UP: If you did not already schedule your post-operative appointment, please call your urologist to schedule and follow-up appointment.  CALL YOUR UROLOGIST IF: You have any bleeding that does not stop, inability to void >8 hours, fever over 100.4 F, chills, persistent nausea/vomiting, changes in your incision concerning for infection, or if your pain is not controlled on your discharge pain medications.   To get better and follow your care plan as instructed.  STENT: You may have an internal stent (a hollow tube that runs from the kidney to your bladder) after your procedure, which helps urine drain from the kidney to your bladder. Some patients experience urinary frequency, burning, or even back pain (especially with urination). These sensations will gradually get better. Increasing your fluid intake can also improve these symptoms. While the stent is in place, your urine may continue to be bloody. This stent is temporary and must be removed by your urologist as an outpatient with in 3 months unless otherwise specified. If your stent is on a string, it is secured to your leg or genitalia with an adhesive bandage. Do not pull on the string, do not remove the bandage, do not insert anything intravaginally/intraurethrally, and do not engage in sexual intercourse until after the stent is removed at your post-operative appointment.  GENERAL: It is common to have blood in your urine after your procedure. It may be pink or even red; inform your doctor if you have a significant amount of clot in the urine or if you are unable to void at all. The urine may clear and then become bloody again especially as you are more physically active.  BATHING: You may shower or bathe.  DIET: You may resume your regular diet and regular medication regimen.  PAIN: You may take Tylenol (acetaminophen) 650-975mg and/or Motrin (ibuprofen) 400-600mg, both available over the counter, for pain every 6 hours as needed. Do not exceed 4000mg of Tylenol (acetaminophen) daily. You may alternate these medications such that you take one or the other every 3 hours for around the clock pain coverage. If you have a stent, the following medications may have been sent to your pharmacy for stent related discomfort: Flomax (tamsulosin) 0.4mg at bedtime until stent removed,   STOOL SOFTENERS: Do not allow yourself to become constipated as straining may cause bleeding. Take stool softeners or a laxative (ex. Miralax, Colace, Senokot, ExLax, etc), available over the counter, if needed.  ACTIVITY: No heavy lifting or strenuous exercise until you are evaluated at your post-operative appointment. Otherwise, you may return to your usual level of physical activity.  ANTICOAGULATION: If you are taking any blood thinning medications, please discuss with your urologist prior to restarting these medications unless otherwise specified.  FOLLOW-UP: If you did not already schedule your post-operative appointment, please call your urologist to schedule and follow-up appointment.  CALL YOUR UROLOGIST IF: You have any bleeding that does not stop, inability to void >8 hours, fever over 100.4 F, chills, persistent nausea/vomiting, changes in your incision concerning for infection, or if your pain is not controlled on your discharge pain medications.

## 2022-05-02 NOTE — PROGRESS NOTE ADULT - ATTENDING COMMENTS
1 yoF with L mid ureteral stone    UA negative for uti, RBCs  afeb, vss    - pain control prn  - OR today for cysto/stent, lithotripsy  - dvt/gi ppx

## 2022-05-02 NOTE — DISCHARGE NOTE PROVIDER - NSDCFUADDAPPT_GEN_ALL_CORE_FT
Please follow up with Primary Doctor and Gynecologist regarding imaging findings of liver lesion and adnexal cyst.

## 2022-05-02 NOTE — DISCHARGE NOTE PROVIDER - HOSPITAL COURSE
41 year old female presented with left flank pain found to have a 7mm proximal ureteral stone on the left.     5/2/22: Patient went to the OR for left Ureteroscopy/LL/Stent placement.   She tolerated the procedure well.     At the time of discharge, the patient was hemodynamically stable, was tolerating PO diet, was voiding urine and passing stool, was ambulating, and was comfortable with adequate pain control. The patient was instructed to follow up with Dr. Sher within 1-2 weeks after discharge from the hospital. The patient/family felt comfortable with discharge. The patient was discharged to home. The patient had no other issues.

## 2022-05-03 ENCOUNTER — TRANSCRIPTION ENCOUNTER (OUTPATIENT)
Age: 42
End: 2022-05-03

## 2022-05-03 VITALS
HEART RATE: 77 BPM | TEMPERATURE: 98 F | RESPIRATION RATE: 16 BRPM | OXYGEN SATURATION: 100 % | DIASTOLIC BLOOD PRESSURE: 70 MMHG | SYSTOLIC BLOOD PRESSURE: 102 MMHG

## 2022-05-03 RX ORDER — TAMSULOSIN HYDROCHLORIDE 0.4 MG/1
1 CAPSULE ORAL
Qty: 7 | Refills: 0
Start: 2022-05-03 | End: 2022-05-09

## 2022-05-03 RX ADMIN — SODIUM CHLORIDE 3 MILLILITER(S): 9 INJECTION INTRAMUSCULAR; INTRAVENOUS; SUBCUTANEOUS at 05:30

## 2022-05-03 RX ADMIN — Medication 15 MILLIGRAM(S): at 16:11

## 2022-05-03 RX ADMIN — SODIUM CHLORIDE 3 MILLILITER(S): 9 INJECTION INTRAMUSCULAR; INTRAVENOUS; SUBCUTANEOUS at 13:03

## 2022-05-03 RX ADMIN — Medication 15 MILLIGRAM(S): at 16:41

## 2022-05-03 NOTE — PROGRESS NOTE ADULT - NS ATTEND BILL GEN_ALL_CORE
[address] EMERGENCY DEPT  [address] [facility]  [place] [address]  [phone]    Work/School Note    Date: 11/27/2021     To Whom It May concern:    Jacinda Calderon was evaluated by the following provider(s):  Attending Provider: Yvonne Gayle MD.   [address] [facility] virus is suspected. Per the CDC guidelines we recommend home isolation until the following conditions are all met:    1. At least 10 days have passed since symptoms first appeared and  2. At least 24 hours have passed since last fever without the use of fever-reducing medications and  3.  Symptoms (e.g., cough, shortness of breath) have improved      Sincerely,          Eddy Todd MD
[address] EMERGENCY DEPT  [address] [facility] [address]  [phone]    Work/School Note    Date: 11/27/2021     To Whom It May concern:    Aldo Joiner was evaluated by the following provider(s):  Attending Provider: Bekah Herrera MD.   [address] virus is suspected. Per the CDC guidelines we recommend home isolation until the following conditions are all met:    1. At least 10 days have passed since symptoms first appeared and  2. At least 24 hours have passed since last fever without the use of fever-reducing medications and  3.  Symptoms (e.g., cough, shortness of breath) have improved      Sincerely,          Marilu Eaton MD
Attending to bill
Attending to bill

## 2022-05-03 NOTE — DISCHARGE NOTE NURSING/CASE MANAGEMENT/SOCIAL WORK - NSDCPEFALRISK_GEN_ALL_CORE
For information on Fall & Injury Prevention, visit: [email or web address] OR  [email or web address] OR  https://www.cdc.gov/steadi/patient.html

## 2022-05-03 NOTE — PROGRESS NOTE ADULT - REASON FOR ADMISSION
ureterolithiasis, intractable pain

## 2022-05-03 NOTE — DISCHARGE NOTE NURSING/CASE MANAGEMENT/SOCIAL WORK - HAVE YOU HAD A FIRST COVID-19 BOOSTER?
7/10/2018    Lian Martinez MD  [address] [facility] [place] [address]  [facility] [address]    RE: Jose Borgeser       Dear Colleague,    I had the pleasure of seeing Jose Coronado in the [facility].    History of Present Illness:     Jose Coronado is a 62 year old female followed here by Dr. Church.  She returns today to follow-up on an echocardiogram off of her Plavix post PFO closure.  She has a previous history of stroke.  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 Grider. Hypercoagulable panel was unremarkable.  She was placed on warfarin initially as it was felt that this could have been paroxysmal atrial fibrillation as she has a history of palpitations on atenolol therapy.  As it turns out it was paroxysmal atrial tachycardia.  During the workup she was found to have an atrial septal defect/PFO. She underwent percutaneous closure of her patent foramen ovale on July 13, 2017 using an Amplatzer septal occluder device.  Her three-month echo with bubble study showed no ongoing shunt however her ejection fraction was low normal at 50-55% with no history of coronary artery disease in the past.    Cardiac MRI done in January of this year showed normal LV function at 61% with no evidence of ischemia.  She had trace tricuspid insufficiency.  Her echocardiogram recently shows LV function at 55-60% with 1-2+ tricuspid insufficiency per the reader.  I have had Dr. Church reviewed this and he feels it is trace to 1+ and the patient has been notified.  There is no evidence of thrombus.     She has a mildly dilated aorta at 39 mm however on her current echo of her aorta in the ascending position measures 3.6 cm. She has a history of hypertension.      She has been in weight watchers and has now lost a total of about 60 pounds over the past year and is maintaining this.  She hopes to lose 20-25 more pounds.         She has no cardiac  complaints today.  She is overall doing very well.  Exam is unremarkable.    She remains on aspirin 325 mg daily.           Impression/Plan:      1.  Patent foramen ovale status successful closure.  -Echo in 1 year, this will be 2 years from her closure and follow-up with Dr. Church at that time  -Continue aspirin to 325 mg daily  -No further need for SBE prophylaxis       2.  Mild cardiomyopathy improved to normal.     3.  History of CVA  -Continue risk factor modification  -Wear support hose on upcoming flights       4.  Dyslipidemia  -Treated to goal continue Lipitor  -LDL 43 HDL 66 triglyceride 46     5.  Hypertension  -Controlled       6. 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    7.  Ascending aorta are now normal in size.    -Continue good blood pressure control.        It has been a pleasure seeing Jose Coronado in follow up.     Joanie Soriano, MSN, APRN-BC, CNP  Cardiology    Orders Placed This Encounter   Procedures     Follow-Up with Cardiologist     Echocardiogram     Orders Placed This Encounter   Medications     hydrochlorothiazide (MICROZIDE) 12.5 MG capsule     Sig: Take 12.5 mg by mouth daily     Medications Discontinued During This Encounter   Medication Reason     clopidogrel (PLAVIX) 75 MG tablet Stopped by Patient     aspirin 81 MG tablet Stopped by Patient     hydrochlorothiazide (HYDRODIURIL) 25 MG tablet Stopped by Patient         Encounter Diagnoses   Name Primary?     PFO (patent foramen ovale)      Essential hypertension with goal blood pressure less than 140/90 Yes     Paroxysmal atrial fibrillation (H)        CURRENT MEDICATIONS:  Current Outpatient Prescriptions   Medication Sig Dispense Refill     acetaminophen (TYLENOL) 325 MG tablet Take 2 tablets (650 mg) by mouth every 4 hours as needed for mild pain 100 tablet      Ascorbic Acid (VITAMIN C PO) Take 250 mg by mouth 2 times daily  (0.5 x 500 mg tablet = 250 mg dose)       aspirin 325 MG tablet Take 325 mg by mouth       atenolol (TENORMIN) 50 MG tablet TAKE 1 TABLET (50 MG) BY MOUTH DAILY 90 tablet 1     atorvastatin (LIPITOR) 40 MG tablet TAKE 1 TABLET (40 MG) BY MOUTH DAILY 90 tablet 0     Calcium Citrate-Vitamin D (CALCIUM CITRATE + D PO) Take 2 tablets by mouth every morning        Cyanocobalamin 2500 MCG TABS Take 2,500 mcg by mouth twice a week Mon, Thur       Ferrous Sulfate 27 MG TABS Take 27 mg by mouth 2 times daily       FIBER COMPLETE PO Take 1 capsule by mouth daily        hydrochlorothiazide (MICROZIDE) 12.5 MG capsule Take 12.5 mg by mouth daily       hydrochlorothiazide 12.5 MG TABS tablet Take 1 tablet (12.5 mg) by mouth daily 90 tablet 1     IBUPROFEN PO Take 200 mg by mouth every 6 hours as needed        imipramine (TOFRANIL) 25 MG tablet TAKE 1 TABLET (25 MG) BY MOUTH DAILY. KEEP APPT 30 tablet 1     ketotifen (ZADITOR) 0.025 % SOLN Place 1 drop into both eyes every 12 hours as needed for itching 1 Bottle      loratadine (CLARITIN) 10 MG tablet Take 10 mg by mouth daily       losartan (COZAAR) 50 MG tablet Take 1 tablet (50 mg) by mouth daily 90 tablet 1     topiramate (TOPAMAX) 25 MG tablet Take 1 tablet (25 mg) by mouth 2 times daily Prescribed by Dr Tello (Patient taking differently: Take 25 mg by mouth daily Prescribed by Dr Tello) 180 tablet 3     UNABLE TO FIND CPAP machine every night       valACYclovir (VALTREX) 1000 mg tablet Take 2 tablets (2,000 mg) by mouth 2 times daily as needed 4 tablet 5     VITAMIN D, CHOLECALCIFEROL, PO Take 500 Units by mouth daily        atenolol (TENORMIN) 25 MG tablet TAKE 2 TABLETS (50 MG) BY MOUTH DAILY (Patient not taking: Reported on 7/10/2018) 180 tablet 2     LOSARTAN POTASSIUM PO Take 50 mg by mouth daily (0.5 x 100 mg tablet = 50 mg dose)       [DISCONTINUED] hydrochlorothiazide (HYDRODIURIL) 25 MG tablet Take 0.5 tablets (12.5 mg) by mouth daily (Patient not taking: Reported  No on 7/10/2018) 45 tablet 3       ALLERGIES     Allergies   Allergen Reactions     Contrast Dye Itching     Reaction of immediate burning and severe itching in Right ear after injection for CT.      Sulfa Drugs      hives       PAST MEDICAL HISTORY:  Past Medical History:   Diagnosis Date     Anemia      CVA (cerebral vascular accident) (H) 2017    ?migraine, ?pfo--negative vasc w/u, neg hypercoag w/u     Diffuse cystic mastopathy     Fibrocystic breast disease     HTN, goal below 140/90      Hyperparathyroidism (H)      Infectious mononucleosis     Mono at age 16     Irregular heart beat     PAT no afib on 30day monitor     Labyrinthitis, unspecified      Migraine headache with aura      Osteopenia      Pain in joint, shoulder region     Secondary to a fall     PFO (patent foramen ovale)     s/p closure with amplazter device 7/13/17     S/P gastric bypass June, 2010     Sleep apnea     she is on CPAP     Vitamin D deficiencies        PAST SURGICAL HISTORY:  Past Surgical History:   Procedure Laterality Date     C ANEURYSM, INTRACRAN, SIMPLE SURG  04/2017    coil of aneurysm right posterior paraophthalmic artery     C NONSPECIFIC PROCEDURE      S/P multiple breast biopies - all negative / benign     C NONSPECIFIC PROCEDURE      S/P T&A     C NONSPECIFIC PROCEDURE      [place] teeth extraction     C NONSPECIFIC PROCEDURE      S/P (? unreadable) ankle     COLONOSCOPY N/A 8/12/2015    Procedure: COLONOSCOPY;  Surgeon: Deandre Brooks MD;  Location:  GI     GASTRIC BYPASS  June 24, 2010     ORTHOPEDIC SURGERY Left 2010    wrist fracture     PARATHYROIDECTOMY  9/19/11       FAMILY HISTORY:  Family History   Problem Relation Age of Onset     Breast Cancer Mother      Hypertension Mother      Arthritis Mother      Thyroid Disease Mother      hypo     Ulcerative Colitis Mother      Breast Cancer Maternal Aunt      Hypertension Paternal Grandmother      Cerebrovascular Disease Maternal Grandmother      Family History  "Negative Maternal Grandfather      Family History Negative Paternal Grandfather      Family History Negative Son      Family History Negative Daughter      Family History Negative Daughter      Ulcerative Colitis Sister      Colon Cancer No family hx of        SOCIAL HISTORY:  Social History     Social History     Marital status: Single     Spouse name: N/A     Number of children: N/A     Years of education: N/A     Social History Main Topics     Smoking status: Never Smoker     Smokeless tobacco: Never Used     Alcohol use 0.0 oz/week     0 Standard drinks or equivalent per week      Comment: 1 glass wine 4-5 days a week     Drug use: No     Sexual activity: Not Currently     Partners: Male     Other Topics Concern     None     Social History Narrative       Review of Systems:  Skin:  Negative       Eyes:  Positive for contacts;glasses    ENT:  Negative hearing loss (left ear only)    Respiratory:  Positive for sleep apnea;CPAP     Cardiovascular:  Negative Positive for;palpitations occ.  Gastroenterology: Negative   loose stool since starting iron; occ  Genitourinary:  not assessed      Musculoskeletal:  Negative      Neurologic:  Negative migraine headaches;stroke (hx of migraines)    Psychiatric:  Negative      Heme/Lymph/Imm:  Positive for allergies    Endocrine:  Negative        Physical Exam:  Vitals: /73  Pulse 69  Ht 1.676 m (5' 6\")  Wt 87.1 kg (192 lb)  BMI 30.99 kg/m2    Constitutional:  cooperative, alert and oriented, well developed, well nourished, in no acute distress        Skin:  warm and dry to the touch, no apparent skin lesions or masses noted     scattered ecchymosis    Head:  normocephalic        Eyes:  pupils equal and round        Lymph:      ENT:  no pallor or cyanosis        Neck:  JVP normal        Respiratory:  normal breath sounds, clear to auscultation, normal A-P diameter, normal symmetry, normal respiratory excursion, no use of accessory muscles         Cardiac: regular " rhythm, normal S1/S2, no S3 or S4, apical impulse not displaced, no murmurs, gallops or rubs                pulses full and equal                                   rigth groin with moderate hemtoma  2cm by 6cm    GI:  abdomen soft obese      Extremities and Muscular Skeletal:  no deformities, clubbing, cyanosis, erythema observed;no edema         healed mirella bite right wrist    Neurological:           Psych:         Recent Lab Results:  LIPID RESULTS:  Lab Results   Component Value Date    CHOL 118 04/16/2018    HDL 66 04/16/2018    LDL 43 04/16/2018    TRIG 46 04/16/2018    CHOLHDLRATIO 2.9 07/29/2015       LIVER ENZYME RESULTS:  Lab Results   Component Value Date    AST 23 02/10/2017    ALT 20 02/10/2017       CBC RESULTS:  Lab Results   Component Value Date    WBC 6.6 04/16/2018    RBC 4.45 04/16/2018    HGB 13.1 04/16/2018    HCT 40.6 04/16/2018    MCV 91 04/16/2018    MCH 29.4 04/16/2018    MCHC 32.3 04/16/2018    RDW 14.1 04/16/2018     04/16/2018       BMP RESULTS:  Lab Results   Component Value Date     07/13/2017    POTASSIUM 3.5 07/13/2017    CHLORIDE 101 07/13/2017    CO2 28 07/13/2017    ANIONGAP 7 07/13/2017    GLC 84 04/17/2017    BUN 16 04/17/2017    CR 0.74 07/13/2017    GFRESTIMATED 85 01/08/2018    GFRESTBLACK >90 01/08/2018    MAXIMILIANO 9.6 04/17/2017        A1C RESULTS:  Lab Results   Component Value Date    A1C 5.7 06/17/2010       INR RESULTS:  Lab Results   Component Value Date    INR 0.93 07/13/2017    INR 1.07 04/10/2017             Thank you for allowing me to participate in the care of your patient.    Sincerely,     RON Bolton [facility]

## 2022-05-03 NOTE — DISCHARGE NOTE NURSING/CASE MANAGEMENT/SOCIAL WORK - NSDCVIVACCINE_GEN_ALL_CORE_FT
----- Message from Shawna Meredith sent at 4/8/2019 11:57 AM EDT -----  Regarding: Dr. Moises Witt  Pt stated she had to cancel appt for March 8th due to having surgery, and was advised she would received a call to r/s another appt before November, but never received a call back. Best contact number  [phone].
Tried to call to set up, but voicemail states to call back.  No message
No Vaccines Administered.

## 2022-05-03 NOTE — PROGRESS NOTE ADULT - ASSESSMENT
41 yoF with L mid ureteral stone    UA negative for uti, RBCs  afeb, vss    - pain control prn  - OR today for cysto/stent, lithotripsy  - dvt/gi ppx    
41 year old female s/p cystoscopy, stone extraction, and stent insertion POD#1    - discharge planning  - follow up with Dr. Sher  - discussed with Dr. Sher
41 yoF with L mid ureteral stone    UA negative for uti, RBCs  afeb, vss    - pain control  - OR planning tmrw for cysto/stent, lithotripsy  - dvt/gi ppx

## 2022-05-03 NOTE — PROGRESS NOTE ADULT - NS ATTEND AMEND GEN_ALL_CORE FT
41 year old female s/p cystoscopy, stone extraction, and stent insertion POD#1  doing well   f/u for stent removal   reviewed incidental findings on ct   being followed by gyn for adnexa, cyst   will f/u pmd re liver findings

## 2022-05-03 NOTE — PROGRESS NOTE ADULT - SUBJECTIVE AND OBJECTIVE BOX
s/p cystoscopy, stone extraction, and stent insertion POD#1  Patient seen and examined at bedside with no complaints.   Denies pain. Admits to some pressure with voiding    Vital Signs Last 24 Hrs  T(F): 98.9 (05-03-22 @ 05:01), Max: 99 (05-02-22 @ 21:20)  HR: 67 (05-03-22 @ 05:01)  BP: 121/75 (05-03-22 @ 05:01)  RR: 17 (05-03-22 @ 05:01)  SpO2: 100% (05-03-22 @ 05:01)    GENERAL: Alert, NAD  CHEST/LUNG: respirations nonlabored  ABDOMEN: soft, Nontender, Nondistended  : no flank tenderness   EXTREMITIES:  no calf tenderness, No edema    I&O's Detail    02 May 2022 07:01  -  03 May 2022 07:00  --------------------------------------------------------  IN:  Total IN: 0 mL    OUT:    Voided (mL): 380 mL  Total OUT: 380 mL    Total NET: -380 mL    LABS:                        12.4   8.71  )-----------( 209      ( 02 May 2022 06:47 )             37.4     05-02    137  |  106  |  5<L>  ----------------------------<  125<H>  3.8   |  24  |  0.90    Ca    9.0      02 May 2022 06:47    
INTERVAL HPI/OVERNIGHT EVENTS:    No acute events overnight.   Pt resting comfortably. No acute complaints.   flank pain  nausea resolved      MEDICATIONS  (STANDING):  dextrose 5% + sodium chloride 0.45% with potassium chloride 20 mEq/L 1000 milliLiter(s) (125 mL/Hr) IV Continuous <Continuous>  polyethylene glycol 3350 17 Gram(s) Oral once  senna 2 Tablet(s) Oral at bedtime  sodium chloride 0.9% lock flush 3 milliLiter(s) IV Push every 8 hours  tamsulosin 0.4 milliGRAM(s) Oral at bedtime    MEDICATIONS  (PRN):  ketorolac   Injectable 30 milliGRAM(s) IV Push every 6 hours PRN Moderate Pain (4 - 6)  ondansetron Injectable 4 milliGRAM(s) IV Push every 6 hours PRN Nausea and/or Vomiting      Vital Signs Last 24 Hrs  T(C): 37 (01 May 2022 06:10), Max: 37 (01 May 2022 06:10)  T(F): 98.6 (01 May 2022 06:10), Max: 98.6 (01 May 2022 06:10)  HR: 74 (01 May 2022 06:10) (56 - 74)  BP: 127/71 (01 May 2022 06:10) (98/58 - 127/71)  BP(mean): 71 (30 Apr 2022 19:48) (71 - 83)  RR: 18 (01 May 2022 06:10) (18 - 19)  SpO2: 100% (01 May 2022 06:10) (99% - 100%)      PHYSICAL EXAM  General: Alert and oriented, not in acute distress  Resp: Breathing unlabored  Abdomen: Soft, nondistended, nontender  : No shine catheter, no dysuria or hematuria  Extremities: No pedal edema    I&O's Detail    30 Apr 2022 07:01  -  01 May 2022 07:00  --------------------------------------------------------  IN:    dextrose 5% + sodium chloride 0.45% w/ Additives: 1500 mL  Total IN: 1500 mL    OUT:  Total OUT: 0 mL    Total NET: 1500 mL          LABS:                        10.4   6.46  )-----------( 169      ( 01 May 2022 06:42 )             31.6             05-01    142  |  111<H>  |  5<L>  ----------------------------<  104<H>  3.6   |  24  |  0.80    Ca    8.4      01 May 2022 06:42    TPro  7.3  /  Alb  3.5  /  TBili  0.3  /  DBili  x   /  AST  14  /  ALT  20  /  AlkPhos  52  04-30      
Interval events:   none    SUBJECTIVE:  Pain not controlled      OBJECTIVE:  Vital Signs Last 24 Hrs  T(C): 37.1 (02 May 2022 04:49), Max: 37.4 (01 May 2022 13:30)  T(F): 98.8 (02 May 2022 04:49), Max: 99.4 (01 May 2022 20:46)  HR: 76 (02 May 2022 04:49) (72 - 80)  BP: 124/76 (02 May 2022 04:49) (110/69 - 124/76)  BP(mean): 82 (01 May 2022 20:46) (82 - 82)  RR: 16 (02 May 2022 04:49) (16 - 18)  SpO2: 100% (02 May 2022 04:49) (97% - 100%)    Physical Examination:  GEN: NAD, resting quietly  PULM: symmetric chest rise bilaterally, no increased WOB  ABD: soft  : voiding       LABS:                        12.4   8.71  )-----------( 209      ( 02 May 2022 06:47 )             37.4       05-02    137  |  106  |  5<L>  ----------------------------<  125<H>  3.8   |  24  |  0.90    Ca    9.0      02 May 2022 06:47

## 2022-05-03 NOTE — DISCHARGE NOTE NURSING/CASE MANAGEMENT/SOCIAL WORK - PATIENT PORTAL LINK FT
You can access the FollowMyHealth Patient Portal offered by [facility] by registering at the following website: [email or web address]. By joining [employer]’s FollowMyHealth portal, you will also be able to view your health information using other applications (apps) compatible with our system.

## 2022-05-04 PROCEDURE — 76856 US EXAM PELVIC COMPLETE: CPT

## 2022-05-04 PROCEDURE — 86900 BLOOD TYPING SEROLOGIC ABO: CPT

## 2022-05-04 PROCEDURE — C1889: CPT

## 2022-05-04 PROCEDURE — 96375 TX/PRO/DX INJ NEW DRUG ADDON: CPT

## 2022-05-04 PROCEDURE — 80048 BASIC METABOLIC PNL TOTAL CA: CPT

## 2022-05-04 PROCEDURE — 76000 FLUOROSCOPY <1 HR PHYS/QHP: CPT

## 2022-05-04 PROCEDURE — 83690 ASSAY OF LIPASE: CPT

## 2022-05-04 PROCEDURE — 76830 TRANSVAGINAL US NON-OB: CPT

## 2022-05-04 PROCEDURE — C1758: CPT

## 2022-05-04 PROCEDURE — 87635 SARS-COV-2 COVID-19 AMP PRB: CPT

## 2022-05-04 PROCEDURE — 82365 CALCULUS SPECTROSCOPY: CPT

## 2022-05-04 PROCEDURE — 86850 RBC ANTIBODY SCREEN: CPT

## 2022-05-04 PROCEDURE — 96374 THER/PROPH/DIAG INJ IV PUSH: CPT

## 2022-05-04 PROCEDURE — 80053 COMPREHEN METABOLIC PANEL: CPT

## 2022-05-04 PROCEDURE — 99285 EMERGENCY DEPT VISIT HI MDM: CPT

## 2022-05-04 PROCEDURE — 36415 COLL VENOUS BLD VENIPUNCTURE: CPT

## 2022-05-04 PROCEDURE — C1769: CPT

## 2022-05-04 PROCEDURE — 81001 URINALYSIS AUTO W/SCOPE: CPT

## 2022-05-04 PROCEDURE — 93005 ELECTROCARDIOGRAM TRACING: CPT

## 2022-05-04 PROCEDURE — 85027 COMPLETE CBC AUTOMATED: CPT

## 2022-05-04 PROCEDURE — 93975 VASCULAR STUDY: CPT

## 2022-05-04 PROCEDURE — C2617: CPT

## 2022-05-04 PROCEDURE — 86901 BLOOD TYPING SEROLOGIC RH(D): CPT

## 2022-05-04 PROCEDURE — 88300 SURGICAL PATH GROSS: CPT

## 2022-05-04 PROCEDURE — 74176 CT ABD & PELVIS W/O CONTRAST: CPT | Mod: MA

## 2022-05-04 PROCEDURE — 83605 ASSAY OF LACTIC ACID: CPT

## 2022-05-04 PROCEDURE — 84702 CHORIONIC GONADOTROPIN TEST: CPT

## 2022-05-04 PROCEDURE — 85025 COMPLETE CBC W/AUTO DIFF WBC: CPT

## 2022-05-05 ENCOUNTER — NON-APPOINTMENT (OUTPATIENT)
Age: 42
End: 2022-05-05

## 2022-05-12 ENCOUNTER — APPOINTMENT (OUTPATIENT)
Dept: UROLOGY | Facility: CLINIC | Age: 42
End: 2022-05-12
Payer: COMMERCIAL

## 2022-05-12 DIAGNOSIS — N20.1 CALCULUS OF URETER: ICD-10-CM

## 2022-05-12 PROCEDURE — 52310 CYSTOSCOPY AND TREATMENT: CPT

## 2023-04-24 ENCOUNTER — NON-APPOINTMENT (OUTPATIENT)
Age: 43
End: 2023-04-24

## 2023-04-24 DIAGNOSIS — Z87.09 PERSONAL HISTORY OF OTHER DISEASES OF THE RESPIRATORY SYSTEM: ICD-10-CM

## 2023-04-24 DIAGNOSIS — Z80.1 FAMILY HISTORY OF MALIGNANT NEOPLASM OF TRACHEA, BRONCHUS AND LUNG: ICD-10-CM

## 2023-04-24 DIAGNOSIS — L29.9 PRURITUS, UNSPECIFIED: ICD-10-CM

## 2023-04-24 DIAGNOSIS — Z87.440 PERSONAL HISTORY OF URINARY (TRACT) INFECTIONS: ICD-10-CM

## 2024-03-30 ENCOUNTER — NON-APPOINTMENT (OUTPATIENT)
Age: 44
End: 2024-03-30

## 2024-04-04 ENCOUNTER — APPOINTMENT (OUTPATIENT)
Dept: OBGYN | Facility: CLINIC | Age: 44
End: 2024-04-04
Payer: COMMERCIAL

## 2024-04-04 VITALS
DIASTOLIC BLOOD PRESSURE: 74 MMHG | SYSTOLIC BLOOD PRESSURE: 120 MMHG | HEIGHT: 61 IN | BODY MASS INDEX: 25.3 KG/M2 | WEIGHT: 134 LBS | HEART RATE: 85 BPM

## 2024-04-04 DIAGNOSIS — Z83.3 FAMILY HISTORY OF DIABETES MELLITUS: ICD-10-CM

## 2024-04-04 DIAGNOSIS — Z83.438 FAMILY HISTORY OF OTHER DISORDER OF LIPOPROTEIN METABOLISM AND OTHER LIPIDEMIA: ICD-10-CM

## 2024-04-04 DIAGNOSIS — Z82.49 FAMILY HISTORY OF ISCHEMIC HEART DISEASE AND OTHER DISEASES OF THE CIRCULATORY SYSTEM: ICD-10-CM

## 2024-04-04 DIAGNOSIS — N20.0 CALCULUS OF KIDNEY: ICD-10-CM

## 2024-04-04 DIAGNOSIS — Z80.3 FAMILY HISTORY OF MALIGNANT NEOPLASM OF BREAST: ICD-10-CM

## 2024-04-04 PROCEDURE — 58100 BIOPSY OF UTERUS LINING: CPT

## 2024-04-04 PROCEDURE — 99204 OFFICE O/P NEW MOD 45 MIN: CPT | Mod: 25

## 2024-04-04 RX ORDER — OXYBUTYNIN CHLORIDE 10 MG/1
10 TABLET, EXTENDED RELEASE ORAL DAILY
Qty: 30 | Refills: 1 | Status: DISCONTINUED | COMMUNITY
Start: 2022-05-05 | End: 2024-04-04

## 2024-04-04 NOTE — REVIEW OF SYSTEMS
[Fatigue] : fatigue [Night Sweats] : night sweats [Recent Weight Gain (___ Lbs)] : recent [unfilled] ~Ulb weight gain [SOB on Exertion] : shortness of breath on exertion [Palpitations] : palpitations [Abdominal Pain] : abdominal pain [Constipation] : constipation [Bloating] : bloating [Nausea] : nausea [Abn Vaginal bleeding] : abnormal vaginal bleeding [Pelvic pain] : pelvic pain [Arthralgias] : arthralgias [Back Pain] : back pain [Headache] : headache [Dizziness] : dizziness [Anxiety] : anxiety [Sleep Disturbances] : sleep disturbances [PMS/PMDD Symptoms] : PMS/PMDD symptoms [Feeling Weak] : feeling weak [Negative] : Heme/Lymph

## 2024-04-09 LAB — CORE LAB BIOPSY: NORMAL

## 2024-04-09 NOTE — PHYSICAL EXAM
[Chaperone Present] : A chaperone was present in the examining room during all aspects of the physical examination [Appropriately responsive] : appropriately responsive [Regular Rate Rhythm] : regular rate rhythm [No Murmurs] : no murmurs [Clear to Auscultation B/L] : clear to auscultation bilaterally [Soft] : soft [Non-tender] : non-tender [Non-distended] : non-distended [No Mass] : no mass [Oriented x3] : oriented x3 [Labia Majora] : normal [Labia Minora] : normal [Scant] : There was scant vaginal bleeding [Normal] : normal [Tenderness] : tender [Enlarged ___ wks] : enlarged [unfilled] ~Uweeks [Anteversion] : anteverted [Mass ___ cm] : a [unfilled] ~Ucm uterine mass was palpated [Uterine Adnexae] : non-palpable [FreeTextEntry5] : posisble small punctate areas of endometriosis noted on external os 3 oclock position [FreeTextEntry6] : deviated to patient's right with several palpable irregular 3-4 cm fibroids, mildly tender, L ovarian mass not palpated

## 2024-04-09 NOTE — PLAN
[FreeTextEntry1] : Refer to GI/Dr. Espinoza for colonoscopy/abd pain/bloating/constipation eval - pt currently on Benefiber and Miralax and I encouraged her to use this daily  Records rev from Dr. Palacio's office including office note, MRI, GC/PAP results - labs noted on patient's phone - will request a copy sent from office to have in our chart as well  Will schedule for robotic vs laparoscopic total hysterectomy, left salpingo oophorectomy, pelvic washings, R salpingectomy, poss endometriosis excision, poss lysis of adhesions, cystoscopy  Pt understands there is a higher chance of bowel involvement given mri read and we reviewed higher chance of inadvertent or intentional bowel injury for full endometriosis resection and poss low risk but existing risk of needing a colostomy   We discussed r/b/a of surgery including bleeding, infection, damage to internal visceral organs such as bowel, bladder, nerves, vessels, and ureters, fistulas, thrombotic events, stroke, MI, death. Possible need for laparotomy also was reviewed. We reviewed operative risks being < 5%.  She is amenable to blood products Post op course was reviewed We discussed recovery All questions were answered She will undergo ERAS protocol with poss same day discharge if she qualifies  No clearance is required, we will obtain preop EKG with PST given symptoms of possible anemia although last Hgb 10.4 done on 2/17, will also add on HE4/CEA as only /CA 19-9 were done in office   Will ask patient to obtain MRI disc so I can review images given done at [facility] facility

## 2024-04-09 NOTE — HISTORY OF PRESENT ILLNESS
[Patient reported mammogram was normal] : Patient reported mammogram was normal [Patient reported PAP Smear was normal] : Patient reported PAP Smear was normal [Regular Cycle Intervals] : periods have been regular [Frequency: Q ___ days] : menstrual periods occur approximately every [unfilled] days [Menarche Age: ____] : age at menarche was [unfilled] [Mammogramdate] : 02/24 [PapSmeardate] : 02/24 [TextBox_31] : NILM/HPV neg [FreeTextEntry1] : 02/06/24

## 2024-04-09 NOTE — REASON FOR VISIT
[Consultation] : consultation for [Abnormal Uterine Bleeding] : abnormal uterine bleeding [FreeTextEntry1] : Dr. Palacio

## 2024-04-09 NOTE — PROCEDURE
[Endometrial Biopsy] : Endometrial biopsy [Time out performed] : Pre-procedure time out performed.  Patient's name, date of birth and procedure confirmed. [Consent Obtained] : Consent obtained [Irregular Bleeding] : irregular uterine bleeding [Risks] : risks [Benefits] : benefits [Infection] : infection [Bleeding] : bleeding [Pain] : pain [Negative] : negative pregnancy test [Betadine] : Betadine [Tenaculum] : Tenaculum [Required Dilation] : required dilation [Sounded to ___ cm] : sounded to [unfilled] ~Ucm [Moderate] : moderate [Specimen Collected] : collected [Sent to Pathology] : placed in buffered formalin and sent for pathology [Tolerated Well] : Patient tolerated the procedure well [No Complications] : No complications [de-identified] : bleeding irregularly  [de-identified] : stenotic with cervical mass  [de-identified] : admixed with blood

## 2024-04-09 NOTE — RESULTS/DATA
[TextEntry] : MRI Pelvis 3/27  Heterogeneous uterus with possible adenomyosis and mulitple fibroids 3.2 cm IM wiht prominent SM component and pedunculated fibroid frm R fundus, endocervical canal dilated with mild complex fluid, apparent 2.2 cm pedunculated mass viewed with suspicion for neoplasia, enlarged L ovary, possible mild deep pelvic endometriosis posteriorly on L  Uterus 10 x 5 x 5 cm, 3 cm SS R anterior body fibroid, 4 cm pedunculated SS r fundal fibroid, additional smaller IM and SS fibroids < 1 cm, cervix with fluid and 2 cm mass extends to level of external os but does not protrude into vagina, EMS 2 mm markedly deviated, adenomyosis noted in heterogeneous, R ovary 2 x 2 x 2  cm, L ovary 4 x 4 x 4cm, enlarged and replaced by complex mass that appears to have fat suspected for dermoid, mild tethering of anterior rectal wall to cervix with minimal thickening of uterine ligaments on L poss DIE

## 2024-04-16 ENCOUNTER — APPOINTMENT (OUTPATIENT)
Dept: GASTROENTEROLOGY | Facility: CLINIC | Age: 44
End: 2024-04-16
Payer: COMMERCIAL

## 2024-04-16 VITALS
SYSTOLIC BLOOD PRESSURE: 140 MMHG | DIASTOLIC BLOOD PRESSURE: 90 MMHG | HEART RATE: 78 BPM | BODY MASS INDEX: 26.13 KG/M2 | WEIGHT: 138.4 LBS | OXYGEN SATURATION: 99 % | HEIGHT: 61 IN

## 2024-04-16 PROCEDURE — 99203 OFFICE O/P NEW LOW 30 MIN: CPT

## 2024-04-16 RX ORDER — SODIUM SULFATE, POTASSIUM SULFATE AND MAGNESIUM SULFATE 1.6; 3.13; 17.5 G/177ML; G/177ML; G/177ML
17.5-3.13-1.6 SOLUTION ORAL
Qty: 1 | Refills: 0 | Status: ACTIVE | COMMUNITY
Start: 2024-04-16 | End: 1900-01-01

## 2024-04-16 NOTE — PHYSICAL EXAM
[Alert] : alert [Normal Voice/Communication] : normal voice/communication [No Acute Distress] : no acute distress [Sclera] : the sclera and conjunctiva were normal [Abdomen Tenderness] : non-tender [No Masses] : no abdominal mass palpated [Abdomen Soft] : soft [Oriented To Time, Place, And Person] : oriented to person, place, and time [Normal Affect] : the affect was normal [Normal Mood] : the mood was normal

## 2024-04-16 NOTE — ASSESSMENT
[FreeTextEntry1] : Constipation.  - continue Miralax/benefiber - advised to add movement and more dietary fiber - consider IBgard and simethicone for bloating associated with constipation  Screening colonoscopy after she turns 44.   Is having GYN surgery April 30.   F/U PRN

## 2024-04-16 NOTE — REVIEW OF SYSTEMS
[Fever] : no fever [Chills] : no chills [Feeling Tired] : feeling tired [Constipation] : constipation [Melena (black stool)] : no melena [Bleeding] : no bleeding [Bloating (gassiness)] : bloating

## 2024-04-16 NOTE — HISTORY OF PRESENT ILLNESS
[FreeTextEntry1] : Ref MD: Dr. Hebert  43F here for evaluation of bloating and constipation for years, but for the past 2 years, worse. Had seen another GI in June 2023 and was recommended Miralax and benefiber, which helps her. When she has bm's daily, doesn't have stomach issues as bad. Reports she has a sensitive stomach. Had two cups of coffee yesterday and the pain was so bad. Bagels - always, even when she is having regular bm, cause abdominal pain. No issues with regular bread. When she feels bloated - feels like she is pregnant. Sometimes when she gets this way drinks peppermint tea and feels better. Can get nauseated, no emesis. TSH per Dr. DE LUNA's note 1.2 TSH. If doesn't take benefiber and Miralax daily, wont' have a bm. No blood in stool. Is gaining weight - about 10 lbs in a year. hgb 10.2 per GYN note. No prior endoscopic work up.  Tries to drink 3-4 bottles of water per day.  No exercise.  Diet - sometimes eats breakfast - butter roll or home fries. Lunch - doesn't eat lunch. Dinner - rice, beans. Not a whole lot of vegetables in diet.   PMhx - none PSHx - rhinoplasty, L thigh cyst, nasal polypectomy, kidney stone All - NKDA; environmental allergies Meds - OCP Shx - non-smoker, no ETOH, no illicits Fhx - mother with constipation, no FHx CRC but father had some colon surgery

## 2024-04-25 ENCOUNTER — OUTPATIENT (OUTPATIENT)
Dept: OUTPATIENT SERVICES | Facility: HOSPITAL | Age: 44
LOS: 1 days | End: 2024-04-25

## 2024-04-25 VITALS
HEIGHT: 61 IN | WEIGHT: 134.92 LBS | HEART RATE: 70 BPM | DIASTOLIC BLOOD PRESSURE: 80 MMHG | RESPIRATION RATE: 16 BRPM | TEMPERATURE: 98 F | SYSTOLIC BLOOD PRESSURE: 145 MMHG | OXYGEN SATURATION: 98 %

## 2024-04-25 DIAGNOSIS — N20.1 CALCULUS OF URETER: ICD-10-CM

## 2024-04-25 DIAGNOSIS — Z98.890 OTHER SPECIFIED POSTPROCEDURAL STATES: Chronic | ICD-10-CM

## 2024-04-25 DIAGNOSIS — R14.0 ABDOMINAL DISTENSION (GASEOUS): ICD-10-CM

## 2024-04-25 DIAGNOSIS — K59.00 CONSTIPATION, UNSPECIFIED: ICD-10-CM

## 2024-04-25 LAB
A1C WITH ESTIMATED AVERAGE GLUCOSE RESULT: 5 % — SIGNIFICANT CHANGE UP (ref 4–5.6)
ANION GAP SERPL CALC-SCNC: 12 MMOL/L — SIGNIFICANT CHANGE UP (ref 7–14)
APPEARANCE UR: CLEAR — SIGNIFICANT CHANGE UP
BILIRUB UR-MCNC: NEGATIVE — SIGNIFICANT CHANGE UP
BLD GP AB SCN SERPL QL: NEGATIVE — SIGNIFICANT CHANGE UP
BUN SERPL-MCNC: 13 MG/DL — SIGNIFICANT CHANGE UP (ref 7–23)
CALCIUM SERPL-MCNC: 8.7 MG/DL — SIGNIFICANT CHANGE UP (ref 8.4–10.5)
CHLORIDE SERPL-SCNC: 107 MMOL/L — SIGNIFICANT CHANGE UP (ref 98–107)
CO2 SERPL-SCNC: 21 MMOL/L — LOW (ref 22–31)
COLOR SPEC: YELLOW — SIGNIFICANT CHANGE UP
CREAT SERPL-MCNC: 0.64 MG/DL — SIGNIFICANT CHANGE UP (ref 0.5–1.3)
DIFF PNL FLD: ABNORMAL
EGFR: 112 ML/MIN/1.73M2 — SIGNIFICANT CHANGE UP
ESTIMATED AVERAGE GLUCOSE: 97 — SIGNIFICANT CHANGE UP
GLUCOSE SERPL-MCNC: 82 MG/DL — SIGNIFICANT CHANGE UP (ref 70–99)
GLUCOSE UR QL: NEGATIVE MG/DL — SIGNIFICANT CHANGE UP
HCG UR QL: NEGATIVE — SIGNIFICANT CHANGE UP
HCT VFR BLD CALC: 27.7 % — LOW (ref 34.5–45)
HGB BLD-MCNC: 8.6 G/DL — LOW (ref 11.5–15.5)
KETONES UR-MCNC: NEGATIVE MG/DL — SIGNIFICANT CHANGE UP
LEUKOCYTE ESTERASE UR-ACNC: NEGATIVE — SIGNIFICANT CHANGE UP
MCHC RBC-ENTMCNC: 26.2 PG — LOW (ref 27–34)
MCHC RBC-ENTMCNC: 31 GM/DL — LOW (ref 32–36)
MCV RBC AUTO: 84.5 FL — SIGNIFICANT CHANGE UP (ref 80–100)
NITRITE UR-MCNC: NEGATIVE — SIGNIFICANT CHANGE UP
NRBC # BLD: 0 /100 WBCS — SIGNIFICANT CHANGE UP (ref 0–0)
NRBC # FLD: 0 K/UL — SIGNIFICANT CHANGE UP (ref 0–0)
PH UR: 6.5 — SIGNIFICANT CHANGE UP (ref 5–8)
PLATELET # BLD AUTO: 311 K/UL — SIGNIFICANT CHANGE UP (ref 150–400)
POTASSIUM SERPL-MCNC: 3.8 MMOL/L — SIGNIFICANT CHANGE UP (ref 3.5–5.3)
POTASSIUM SERPL-SCNC: 3.8 MMOL/L — SIGNIFICANT CHANGE UP (ref 3.5–5.3)
PROT UR-MCNC: 30 MG/DL
RBC # BLD: 3.28 M/UL — LOW (ref 3.8–5.2)
RBC # FLD: 12.6 % — SIGNIFICANT CHANGE UP (ref 10.3–14.5)
RH IG SCN BLD-IMP: POSITIVE — SIGNIFICANT CHANGE UP
RH IG SCN BLD-IMP: POSITIVE — SIGNIFICANT CHANGE UP
SODIUM SERPL-SCNC: 140 MMOL/L — SIGNIFICANT CHANGE UP (ref 135–145)
SP GR SPEC: 1.02 — SIGNIFICANT CHANGE UP (ref 1–1.03)
UROBILINOGEN FLD QL: 0.2 MG/DL — SIGNIFICANT CHANGE UP (ref 0.2–1)
WBC # BLD: 5.24 K/UL — SIGNIFICANT CHANGE UP (ref 3.8–10.5)
WBC # FLD AUTO: 5.24 K/UL — SIGNIFICANT CHANGE UP (ref 3.8–10.5)

## 2024-04-25 RX ORDER — SODIUM CHLORIDE 9 MG/ML
1000 INJECTION, SOLUTION INTRAVENOUS
Refills: 0 | Status: DISCONTINUED | OUTPATIENT
Start: 2024-04-30 | End: 2024-05-14

## 2024-04-25 RX ORDER — SODIUM CHLORIDE 9 MG/ML
3 INJECTION INTRAMUSCULAR; INTRAVENOUS; SUBCUTANEOUS EVERY 8 HOURS
Refills: 0 | Status: DISCONTINUED | OUTPATIENT
Start: 2024-04-30 | End: 2024-05-14

## 2024-04-25 NOTE — H&P PST ADULT - HISTORY OF PRESENT ILLNESS
44 y/o female PMH kidney stones (lithotripsy 2022) anemia, constipation presents to presurgical testing for an evaluation for a scheduled robotic total hysterectomy, left salpingo oophorectomy, right salpingectomy, possible excision of endometriosis, possible lysis of adhesions, cystoscopy.

## 2024-04-25 NOTE — H&P PST ADULT - ATTENDING COMMENTS
Consent signed for robotic assisted total laparoscopic hysterectomy, bilateral salpingectomy, left oophorectomy, cystoscopy, any additional procedures. Will do pelvic washings. Draw remaining tumor markers in preop.

## 2024-04-25 NOTE — H&P PST ADULT - IS PATIENT PREGNANT?
Patient Education     Human Papillomavirus Type 6 L1 Capsid Protein antigen, Human Papillomavirus Type 11 L1 Capsid Protein antigen, Human Papillomavirus Type 16 L1 Capsid Protein antigen, Human Papillomavirus Type 18 L1 Capsid Protein antigen Suspension for injection  What is this medicine?  HUMAN PAPILLOMAVIRUS VACCINE (HYOO muhn pap  [facility] SE[facility]) is a vaccine. It is used to prevent infections of four types of the human papillomavirus. In women, the vaccine may lower your risk of getting cervical, vaginal, vulvar, or anal cancer and genital warts. In men, the vaccine may lower your risk of getting genital warts and anal cancer. You cannot get these diseases from the vaccine. This vaccine does not treat these diseases.  This medicine may be used for other purposes; ask your health care provider or pharmacist if you have questions.  What should I tell my health care provider before I take this medicine?  They need to know if you have any of these conditions:  · fever or infection  · hemophilia  · HIV infection or AIDS  · immune system problems  · low platelet count  · an unusual reaction to Human Papillomavirus Vaccine, yeast, other medicines, foods, dyes, or preservatives  · pregnant or trying to get pregnant  · breast-feeding  How should I use this medicine?  This vaccine is for injection in a muscle on your upper arm or thigh. It is given by a health care professional. You will be observed for 15 minutes after each dose. Sometimes, fainting happens after the vaccine is given. You may be asked to sit or lie down during the 15 minutes. Three doses are given. The second dose is given 2 months after the first dose. The last dose is given 4 months after the second dose.  A copy of a Vaccine Information Statement will be given before each vaccination. Read this sheet carefully each time. The sheet may change frequently.  Talk to your pediatrician regarding the use of this medicine in children. While  this drug may be prescribed for children as young as 9 years of age for selected conditions, precautions do apply.  Overdosage: If you think you have taken too much of this medicine contact a poison control center or emergency room at once.  NOTE: This medicine is only for you. Do not share this medicine with others.  What if I miss a dose?  All 3 doses of the vaccine should be given within 6 months. Remember to keep appointments for follow-up doses. Your health care provider will tell you when to return for the next vaccine. Ask your health care professional for advice if you are unable to keep an appointment or miss a scheduled dose.  What may interact with this medicine?  · other vaccines  This list may not describe all possible interactions. Give your health care provider a list of all the medicines, herbs, non-prescription drugs, or dietary supplements you use. Also tell them if you smoke, drink alcohol, or use illegal drugs. Some items may interact with your medicine.  What should I watch for while using this medicine?  This vaccine may not fully protect everyone. Continue to have regular pelvic exams and cervical or anal cancer screenings as directed by your doctor.  The Human Papillomavirus is a sexually transmitted disease. It can be passed by any kind of sexual activity that involves genital contact. The vaccine works best when given before you have any contact with the virus. Many people who have the virus do not have any signs or symptoms.  Tell your doctor or health care professional if you have any reaction or unusual symptom after getting the vaccine.  What side effects may I notice from receiving this medicine?  Side effects that you should report to your doctor or health care professional as soon as possible:  · allergic reactions like skin rash, itching or hives, swelling of the face, lips, or tongue  · breathing problems  · feeling faint or lightheaded, falls  Side effects that usually do not  require medical attention (report to your doctor or health care professional if they continue or are bothersome):  · cough  · dizziness  · fever  · headache  · nausea  · redness, warmth, swelling, pain, or itching at site where injected  This list may not describe all possible side effects. Call your doctor for medical advice about side effects. You may report side effects to FDA at 9-680-XMB-3980.  Where should I keep my medicine?  This drug is given in a hospital or clinic and will not be stored at home.  NOTE:This sheet is a summary. It may not cover all possible information. If you have questions about this medicine, talk to your doctor, pharmacist, or health care provider. Copyright© 2016 [employer]           Patient Education     Preventing Vaginitis     Use mild, unscented soap when you bathe or shower to avoid irritating your vagina.    Vaginitis is irritation or infection of the vagina or vulva (the outside opening of the vagina). Vaginitis can be caused by bacteria, viruses, parasites, or yeast. Chemicals (such as in perfumes or soaps or in spermicides) can sometimes be a cause. Vaginitis can be caused by hormone changes in pregnancy or with menopause. You can help prevent vaginitis. Follow the tips below. And see your healthcare provider if you have any symptoms.  Hygiene  · Avoid chemicals. Do not use vaginal sprays. Do not use scented toilet paper or tampons that are scented. Sprays and scents have chemicals that can irritate your vagina.  · Do not douche unless you are told to by your healthcare provider. Douching is rarely needed. And it upsets the normal balance in the vagina.  · Wash yourself well. Wash the outer vaginal area (vulva) every day with mild, unscented soap. Keep it as dry as possible.  · Wipe correctly. Make sure to wipe from front to back after a bowel movement. This helps keep from spreading bacteria from your anus to your vagina.  · Change your tampon often. During your period,  make sure to change your tampon as often as directed on the package. This allows the normal flow of vaginal discharge and blood.  Lifestyle  · Limit your number of sexual partners. The more partners you have, the greater your risk of infection. Using condoms helps reduce your risk.  · Get enough sleep. Sleep helps keep your body’s immune system healthy. This helps you fight infection.  · Lose weight, if needed. Excess weight can reduce air circulation around your vagina. This can increase your risk of infection.  · Exercise regularly. Regular activity helps keep your body healthy.  · Take antibiotics only as directed. Antibiotics can change the normal chemical balance in the vagina.    Clothing  · Don’t sit in wet clothes. Yeast thrives when it’s warm and damp.  · Don’t wear tight pants. And don’t wear tights, leggings, or hose without a cotton crotch. These types of clothing trap warmth and moisture.  · Wear cotton underwear. Cotton lets air circulate around the vagina.  Symptoms of vaginitis  · Irritation, swelling, or itching of the genital area  · Vaginal discharge  · Bad vaginal odor  · Pain or burning during urination   Date Last Reviewed: 12/1/2016  © 6748-1243 [employer]. [address] [place] [address], [place] [address]. All rights reserved. This information is not intended as a substitute for professional medical care. Always follow your healthcare professional's instructions.              no

## 2024-04-25 NOTE — H&P PST ADULT - NSICDXPASTMEDICALHX_GEN_ALL_CORE_FT
PAST MEDICAL HISTORY:  Abdominal distension     Anemia     Calculus of ureter     Constipation     Fibroids

## 2024-04-25 NOTE — H&P PST ADULT - PROBLEM SELECTOR PLAN 1
Patient tentatively scheduled for robotic total hysterectomy, left salpingo oophorectomy, right salpingectomy, possible excision of endometriosis, possible lysis of adhesions, cystoscopy for 4/30/24. Pre-op instructions provided. Pt given verbal and written instructions with teach back on chlorhexidine shampoo and pepcid. Pt verbalized understanding with return demonstration.     CBC BMP A1C T&S/ABO UA Ucx UCG done

## 2024-04-25 NOTE — H&P PST ADULT - NSICDXPASTSURGICALHX_GEN_ALL_CORE_FT
PAST SURGICAL HISTORY:  H/O knee surgery     H/O lithotripsy     H/O nasal polypectomy     H/O rhinoplasty

## 2024-04-26 ENCOUNTER — NON-APPOINTMENT (OUTPATIENT)
Age: 44
End: 2024-04-26

## 2024-04-26 LAB
CULTURE RESULTS: SIGNIFICANT CHANGE UP
SPECIMEN SOURCE: SIGNIFICANT CHANGE UP

## 2024-04-29 ENCOUNTER — TRANSCRIPTION ENCOUNTER (OUTPATIENT)
Age: 44
End: 2024-04-29

## 2024-04-29 NOTE — ASU PATIENT PROFILE, ADULT - FALL HARM RISK - UNIVERSAL INTERVENTIONS
Bed in lowest position, wheels locked, appropriate side rails in place/Call bell, personal items and telephone in reach/Instruct patient to call for assistance before getting out of bed or chair/Non-slip footwear when patient is out of bed/[place] to call system/Physically safe environment - no spills, clutter or unnecessary equipment/Purposeful Proactive Rounding/Room/bathroom lighting operational, light cord in reach

## 2024-04-30 ENCOUNTER — RESULT REVIEW (OUTPATIENT)
Age: 44
End: 2024-04-30

## 2024-04-30 ENCOUNTER — OUTPATIENT (OUTPATIENT)
Dept: OUTPATIENT SERVICES | Facility: HOSPITAL | Age: 44
LOS: 1 days | Discharge: HOME | End: 2024-04-30
Payer: COMMERCIAL

## 2024-04-30 ENCOUNTER — TRANSCRIPTION ENCOUNTER (OUTPATIENT)
Age: 44
End: 2024-04-30

## 2024-04-30 ENCOUNTER — APPOINTMENT (OUTPATIENT)
Dept: OBGYN | Facility: CLINIC | Age: 44
End: 2024-04-30

## 2024-04-30 VITALS
SYSTOLIC BLOOD PRESSURE: 133 MMHG | DIASTOLIC BLOOD PRESSURE: 75 MMHG | OXYGEN SATURATION: 100 % | TEMPERATURE: 98 F | RESPIRATION RATE: 16 BRPM | HEART RATE: 76 BPM

## 2024-04-30 VITALS
RESPIRATION RATE: 15 BRPM | OXYGEN SATURATION: 100 % | SYSTOLIC BLOOD PRESSURE: 131 MMHG | DIASTOLIC BLOOD PRESSURE: 77 MMHG | HEART RATE: 76 BPM

## 2024-04-30 DIAGNOSIS — Z98.890 OTHER SPECIFIED POSTPROCEDURAL STATES: Chronic | ICD-10-CM

## 2024-04-30 DIAGNOSIS — R14.0 ABDOMINAL DISTENSION (GASEOUS): ICD-10-CM

## 2024-04-30 LAB
CEA SERPL-MCNC: 1.1 NG/ML — SIGNIFICANT CHANGE UP (ref 1–3.8)
GLUCOSE BLDC GLUCOMTR-MCNC: 89 MG/DL — SIGNIFICANT CHANGE UP (ref 70–99)
HCG UR QL: NEGATIVE — SIGNIFICANT CHANGE UP

## 2024-04-30 PROCEDURE — 58543 LSH UTERUS ABOVE 250 G: CPT | Mod: 59

## 2024-04-30 PROCEDURE — 88112 CYTOPATH CELL ENHANCE TECH: CPT | Mod: 26

## 2024-04-30 PROCEDURE — 58571 TLH W/T/O 250 G OR LESS: CPT

## 2024-04-30 PROCEDURE — S2900 ROBOTIC SURGICAL SYSTEM: CPT

## 2024-04-30 PROCEDURE — 88304 TISSUE EXAM BY PATHOLOGIST: CPT | Mod: 26

## 2024-04-30 PROCEDURE — 88305 TISSUE EXAM BY PATHOLOGIST: CPT | Mod: 26

## 2024-04-30 PROCEDURE — 88307 TISSUE EXAM BY PATHOLOGIST: CPT | Mod: 26

## 2024-04-30 PROCEDURE — 58662 LAPAROSCOPY EXCISE LESIONS: CPT

## 2024-04-30 DEVICE — VISTASEAL FIBRIN HUMAN 10ML: Type: IMPLANTABLE DEVICE | Status: FUNCTIONAL

## 2024-04-30 RX ORDER — ACETAMINOPHEN 500 MG
975 TABLET ORAL ONCE
Refills: 0 | Status: COMPLETED | OUTPATIENT
Start: 2024-04-30 | End: 2024-04-30

## 2024-04-30 RX ORDER — FENTANYL CITRATE 50 UG/ML
25 INJECTION INTRAVENOUS
Refills: 0 | Status: DISCONTINUED | OUTPATIENT
Start: 2024-04-30 | End: 2024-04-30

## 2024-04-30 RX ORDER — OXYCODONE HYDROCHLORIDE 5 MG/1
1 TABLET ORAL
Qty: 10 | Refills: 0
Start: 2024-04-30

## 2024-04-30 RX ORDER — KETOROLAC TROMETHAMINE 30 MG/ML
15 SYRINGE (ML) INJECTION ONCE
Refills: 0 | Status: DISCONTINUED | OUTPATIENT
Start: 2024-04-30 | End: 2024-04-30

## 2024-04-30 RX ORDER — ONDANSETRON 8 MG/1
4 TABLET, FILM COATED ORAL ONCE
Refills: 0 | Status: COMPLETED | OUTPATIENT
Start: 2024-04-30 | End: 2024-04-30

## 2024-04-30 RX ORDER — PHENAZOPYRIDINE HCL 100 MG
100 TABLET ORAL ONCE
Refills: 0 | Status: COMPLETED | OUTPATIENT
Start: 2024-04-30 | End: 2024-04-30

## 2024-04-30 RX ORDER — HYDROMORPHONE HYDROCHLORIDE 2 MG/ML
0.5 INJECTION INTRAMUSCULAR; INTRAVENOUS; SUBCUTANEOUS
Refills: 0 | Status: DISCONTINUED | OUTPATIENT
Start: 2024-04-30 | End: 2024-04-30

## 2024-04-30 RX ORDER — CHLORHEXIDINE GLUCONATE 213 G/1000ML
1 SOLUTION TOPICAL ONCE
Refills: 0 | Status: COMPLETED | OUTPATIENT
Start: 2024-04-30 | End: 2024-04-30

## 2024-04-30 RX ORDER — NORGESTIMATE AND ETHINYL ESTRADIOL 7DAYSX3 LO
1 KIT ORAL
Refills: 0 | DISCHARGE

## 2024-04-30 RX ORDER — HEPARIN SODIUM 5000 [USP'U]/ML
5000 INJECTION INTRAVENOUS; SUBCUTANEOUS ONCE
Refills: 0 | Status: COMPLETED | OUTPATIENT
Start: 2024-04-30 | End: 2024-04-30

## 2024-04-30 RX ORDER — CELECOXIB 200 MG/1
200 CAPSULE ORAL ONCE
Refills: 0 | Status: COMPLETED | OUTPATIENT
Start: 2024-04-30 | End: 2024-04-30

## 2024-04-30 RX ORDER — GABAPENTIN 400 MG/1
300 CAPSULE ORAL ONCE
Refills: 0 | Status: COMPLETED | OUTPATIENT
Start: 2024-04-30 | End: 2024-04-30

## 2024-04-30 RX ADMIN — Medication 15 MILLIGRAM(S): at 19:30

## 2024-04-30 RX ADMIN — CHLORHEXIDINE GLUCONATE 1 APPLICATION(S): 213 SOLUTION TOPICAL at 13:21

## 2024-04-30 RX ADMIN — GABAPENTIN 300 MILLIGRAM(S): 400 CAPSULE ORAL at 13:23

## 2024-04-30 RX ADMIN — HEPARIN SODIUM 5000 UNIT(S): 5000 INJECTION INTRAVENOUS; SUBCUTANEOUS at 13:23

## 2024-04-30 RX ADMIN — Medication 15 MILLIGRAM(S): at 19:14

## 2024-04-30 RX ADMIN — Medication 975 MILLIGRAM(S): at 13:23

## 2024-04-30 RX ADMIN — ONDANSETRON 4 MILLIGRAM(S): 8 TABLET, FILM COATED ORAL at 19:35

## 2024-04-30 RX ADMIN — Medication 100 MILLIGRAM(S): at 13:26

## 2024-04-30 RX ADMIN — SODIUM CHLORIDE 30 MILLILITER(S): 9 INJECTION, SOLUTION INTRAVENOUS at 13:21

## 2024-04-30 RX ADMIN — CELECOXIB 200 MILLIGRAM(S): 200 CAPSULE ORAL at 13:22

## 2024-04-30 NOTE — ASU DISCHARGE PLAN (ADULT/PEDIATRIC) - ASU DC SPECIAL INSTRUCTIONSFT
Discharge Instructions:  1. Diet: advance as tolerated  2. Activity limited by:   - no running, heavy lifting, strenuous exercise until cleared by MD   - nothing in vagina (bath, swim, intercourse, douching, tampons) until cleared by MD   3. Medications:   - Senna to prevent constipation (please hold for loose stools)  - Ibuprofen 600mg every 6 hours as needed for pain  - Acetaminophen 500mg every 6 hours as needed for pain  - Oxycodone 5mg every 8 hours as needed for breakthrough pain.   - Please take iron supplementation every day along with Vitamin C   4. Follow-up appointment - 2 weeks. Please call the office upon discharge to schedule your appointment if you do not have one already.   5. Precautions:  - Call the office or go to the ED if you have any of the followin) Fever >100.4 that does not resolve  2) Intractable pain  3) Heavy bleeding

## 2024-04-30 NOTE — BRIEF OPERATIVE NOTE - OPERATION/FINDINGS
Exam under anesthesia demonstrated normal external genitalia, mobile uterus approx 8w in size with appreciable 4cm pedunculated fibroid at the fundus and fibroid in lower uterine segment / cervix. Abdominal survey demonstrated small area of fibrosis at the liver edge, grossly normal gallbladder, stomach, and appendix. Uterus with pedunculated 4cm fibroid off of the fundus. Right fallopian tube and ovary wnl. Left ovary with smooth appearance, 4cm cyst -removed in block and intact with left fallopian tube and ovary. Endometriosis noted on the bilateral uterosacral ligaments and in the rectovaginal space with evidence of rectal tethering. Vistaseal placed atop surgical bed.   Cystoscopy demonstrated bilateral ureteral jets visualized with appropriate bladder distension

## 2024-04-30 NOTE — BRIEF OPERATIVE NOTE - NSICDXBRIEFPOSTOP_GEN_ALL_CORE_FT
POST-OP DIAGNOSIS:  Abnormal uterine bleeding (AUB) 30-Apr-2024 18:49:32  Esha Christopher  Iron deficiency anemia 30-Apr-2024 18:49:25  Esha Christopher  Fibroid uterus 30-Apr-2024 18:49:40  Esha Christopher  Left ovarian cyst 30-Apr-2024 18:49:20  Esha Christopher  Endometriosis, moderate 30-Apr-2024 18:47:16 Stage 3 Esha Christopher

## 2024-04-30 NOTE — BRIEF OPERATIVE NOTE - SPECIMENS
Peritoneal washings, uterus, cervix, left fallopian tube, left ovary with cyst, right fallopian tube Peritoneal washings, uterus, cervix, left fallopian tube, left ovary with cyst, right fallopian tube, cul de sac lesion

## 2024-04-30 NOTE — ASU DISCHARGE PLAN (ADULT/PEDIATRIC) - CARE PROVIDER_API CALL
Amy Hebert  Obstetrics and Gynecology  [address], [address]  [place] [address]  Phone: [phone]  Fax: [phone]  Established Patient  Follow Up Time: 2 weeks

## 2024-04-30 NOTE — CHART NOTE - NSCHARTNOTEFT_GEN_A_CORE
POST-OP CHECK NOTE @ 1922    SUBJECTIVE:    42yo F now POD0 s/p RA- TLH, LSO, RS, excision of endometriosis, and cysto. Pain controlled. Not yet OOB. Has an urge to voice (yet to void). Tolerating sips of clear liquids w/o n/v. Denies fevers, chills, n/v, chest pain, or shortness of breath     fentaNYL    Injectable 25 MICROGram(s) IV Push every 5 minutes PRN  HYDROmorphone  Injectable 0.5 milliGRAM(s) IV Push every 10 minutes PRN  lactated ringers. 1000 milliLiter(s) IV Continuous <Continuous>  ondansetron Injectable 4 milliGRAM(s) IV Push once PRN  sodium chloride 0.9% lock flush 3 milliLiter(s) IV Push every 8 hours      OBJECTIVE:    VITAL SIGNS:  Vital Signs Last 24 Hrs  T(C): 36.6 (30 Apr 2024 19:00), Max: 36.8 (30 Apr 2024 12:45)  T(F): 97.8 (30 Apr 2024 19:00), Max: 98.3 (30 Apr 2024 12:45)  HR: 74 (30 Apr 2024 19:15) (59 - 76)  BP: 125/78 (30 Apr 2024 19:15) (105/63 - 133/75)  BP(mean): 89 (30 Apr 2024 19:15) (74 - 97)  RR: 12 (30 Apr 2024 19:15) (10 - 21)  SpO2: 97% (30 Apr 2024 19:15) (95% - 100%)    Parameters below as of 30 Apr 2024 19:00  Patient On (Oxygen Delivery Method): room air      CAPILLARY BLOOD GLUCOSE      POCT Blood Glucose.: 89 mg/dL (30 Apr 2024 12:09)      04-30-24 @ 07:01  -  04-30-24 @ 19:34  --------------------------------------------------------  IN: 120 mL / OUT: 100 mL / NET: 20 mL        PHYSICAL EXAM:  GEN: No acute distress. Awake. Alert   CV: Regular rate and rhythm on bedside monitor   LUNGS: Unlabored breathing. No respiratory distress  ABD: Soft. Non-tender. Non-distended   Incision: Laparoscopic sites c/d/i w/ overlying opsites  EXT: No calf tenderness bilaterally      ASSESSMENT:  42yo F now POD0 s/p RA- TLH, LSO, RS, excision of endometriosis, and cysto. Patient is stable and progressing postoperatively.    NEURO: Pain controlled on current regimen  CV: Hemodynamically stable   PULM: Saturating well on RA. No acute issues   GI: Advance diet as tolerated  : Due to void   HEME: SCDs. Early ambulation   ID: Afebrile  Dispo: Home once meeting post-operatively milestones     David Baron, PGY-2  Obstetrics and Gynecology    d/w Dr. DOMINGO Hebert

## 2024-04-30 NOTE — ASU DISCHARGE PLAN (ADULT/PEDIATRIC) - NURSING INSTRUCTIONS
DO NOT take any Tylenol (Acetaminophen) or narcotics containing Tylenol until after  7:20PM. You received Tylenol during your operation and it can cause damage to your liver if too much is taken within a 24 hour time period.   Last dose of Toradol was 7:15PM.

## 2024-04-30 NOTE — BRIEF OPERATIVE NOTE - NSICDXBRIEFPREOP_GEN_ALL_CORE_FT
PRE-OP DIAGNOSIS:  Pain pelvic 30-Apr-2024 18:47:30  Esha Christopher  Left ovarian cyst 30-Apr-2024 18:49:09  Esha Christopher  Iron deficiency anemia 30-Apr-2024 18:48:04  Esha Christopher  Abnormal uterine bleeding (AUB) 30-Apr-2024 18:47:34  Esha Christopher  Fibroid uterus 30-Apr-2024 18:47:41  Esha Christopher

## 2024-04-30 NOTE — ASU PREOP CHECKLIST - ALLERGIES REVIEWED
OFFICE VISIT      Patient: Sonia Larry Date of Service: 2019   : 1981 MRN: [id number]     SUBJECTIVE:     Chief Complaint   Patient presents with   • Follow-up     medical clearance for fertility clinic and review labs       HISTORY OF PRESENT ILLNESS:  Sonia Larry is a 37 year old female who presents today for follow-up.    Type 2 diabetes mellitus: Takes Metformin twice daily.    Leukopenia: She had visited the hematologist in the past. Her tests had been done which were normal. The hematologist told her that her count was high. States that her white blood cells count remained like this for the past four years.    Iron deficiency anemia due to chronic blood loss: Reports that she is anemic for many years. Wants to do blood work for anemia and platelet count.    Hypertension: Takes Labetalol 100 mg twice daily. Reports noticing high blood pressure. Today her blood pressure is borderline high.    Irregular menses: Reports that she has been advised to do medical clearance for the infertility. States that this month she has normal menses after 30 days. Thinks that her body gets used to surgery. Reports that her mother also had problems in pregnancy.    Additional comment:  She has done EKG on 2019 which was normal. Denies having stress test.    PAST MEDICAL HISTORY:  Past Medical History:   Diagnosis Date   • Diabetes mellitus (CMS/HCC)    • Essential (primary) hypertension    • RAD (reactive airway disease)        MEDICATIONS:  Current Outpatient Medications   Medication Sig   • labetalol (NORMODYNE) 200 MG tablet Take 1 tablet by mouth 2 times daily.   • pantoprazole (PROTONIX) 40 MG tablet TAKE 1 TABLET BY MOUTH DAILY   • fluticasone (FLOVENT DISKUS) 100 MCG/BLIST inhaler    • metformin (GLUCOPHAGE) 1000 MG tablet Take 1,000 mg by mouth.   • ACCU-CHEK JOSE PLUS test strip TEST AS DIRECTED   • SOFTCLIX LANCETS Misc 2 times daily.   • fluticasone (ARNUITY ELLIPTA) 100 MCG/ACT inhaler Inhale into  the lungs daily.     No current facility-administered medications for this visit.        ALLERGIES:  ALLERGIES:   Allergen Reactions   • Fish   (Food Or Med) ANAPHYLAXIS   • [place]   (Food Or Med) VOMITING     unknown   • Aspirin Other (See Comments)   • Aspirin Adult Low Other (See Comments)     unknown   • [place] HIVES       PAST SURGICAL HISTORY:  Past Surgical History:   Procedure Laterality Date   • Cyst removal     • Myomectomy     • Tonsillectomy         FAMILY HISTORY:  History reviewed. No pertinent family history.    SOCIAL HISTORY:  Social History     Tobacco Use   Smoking Status Never Smoker   Smokeless Tobacco Never Used     Social History     Substance and Sexual Activity   Alcohol Use No   • Frequency: Never       Review of Systems      OBJECTIVE:     Visit Vitals  /88 (BP Location: [facility], Patient Position: Sitting, Cuff Size: Regular)   Temp 98.8 °F (37.1 °C) (Tympanic)   Wt 125.2 kg (276 lb)   BMI 45.93 kg/m²       Physical Exam      Constitutional: alert, in no acute distress and current vital signs reviewed. Blood pressure checked by Dr. Corrales today in the office is 130/88 mmHg.  Head and Face: atraumatic and normocephalic.  Eyes: no discharge, no eyelid swelling and the sclerae were normal.  ENT: normal appearing outer ear, normal appearing nose and normal lips.  Neck: normal appearing neck and supple neck.  Pulmonary: breath sounds clear to auscultation bilaterally, but no respiratory distress and normal respiratory rate and effort.  Cardiovascular: normal rate, regular rhythm, normal S1, normal S2 and edema was not present in the lower extremities.  Abdomen: soft and nontender.  Psychiatric: alert and awake, interactive and mood/affect were appropriate.  Skin, Hair, Nails: normal skin color and pigmentation.    DIAGNOSTIC STUDIES:   LAB RESULTS:    Lab Services on 08/15/2019   Component Date Value Ref Range Status   • Ferritin 08/15/2019 9  8 - 252 ng/mL Final   • IRON 08/15/2019 33* 50 -  170 mcg/dL Final   • VITAMIND, 25 HYDROXY 08/15/2019 24.7* 30.0 - 100.0 ng/mL Final   • TSH 08/15/2019 0.881  0.350 - 5.000 mcUnits/mL Final   • MICROALBUMIN, UA (TTL) 08/15/2019 24.90  mg/dL Final   • CREATININE, URINE (TOTAL) 08/15/2019 155.00  mg/dL Final   • MICROALBUMIN/CREATININE 08/15/2019 160.6* <30 mg/g Final   • COLOR 08/15/2019 JAVIER* YELLOW Final   • APPEARANCE 08/15/2019 HAZY   Final   • GLUCOSE(URINE) 08/15/2019 NEGATIVE  NEGATIVE mg/dL Final   • BILIRUBIN 08/15/2019 NEGATIVE  NEGATIVE Final   • KETONES 08/15/2019 TRACE* NEGATIVE mg/dL Final   • SPECIFIC GRAVITY 08/15/2019 1.020  1.005 - 1.030 Final   • BLOOD 08/15/2019 NEGATIVE  NEGATIVE Final   • pH 08/15/2019 6.0  5.0 - 7.0 Units Final   • PROTEIN(URINE) 08/15/2019 100* NEGATIVE mg/dL Final   • UROBILINOGEN 08/15/2019 0.2  0.0 - 1.0 mg/dL Final   • NITRITE 08/15/2019 NEGATIVE  NEGATIVE Final   • LEUKOCYTE ESTERASE 08/15/2019 NEGATIVE  NEGATIVE Final   • Squamous EPI'S 08/15/2019 1 to 5  0 - 5 /hpf Final   • RBC 08/15/2019 1 to 2  0 - 2 /hpf Final   • WBC 08/15/2019 1 to 5  0 - 5 /hpf Final   • BACTERIA 08/15/2019 NONE SEEN  NONE SEEN /hpf Final   • Hyaline Casts 08/15/2019 NONE SEEN  0 - 5 /lpf Final   • SPECIMEN TYPE 08/15/2019 URINE, CLEAN CATCH/MIDSTREAM   Final   • MUCOUS 08/15/2019 PRESENT   Final   • CA OXALATE CRYSTALS 08/15/2019 PRESENT   Final   • WBC 08/15/2019 12.3* 4.2 - 11.0 K/mcL Final   • RBC 08/15/2019 4.67  4.00 - 5.20 mil/mcL Final   • HGB 08/15/2019 10.3* 12.0 - 15.5 g/dL Final   • HCT 08/15/2019 35.6* 36.0 - 46.5 % Final   • MCV 08/15/2019 76.2* 78.0 - 100.0 fl Final   • MCH 08/15/2019 22.1* 26.0 - 34.0 pg Final   • MCHC 08/15/2019 28.9* 32.0 - 36.5 g/dL Final   • RDW-CV 08/15/2019 18.8* 11.0 - 15.0 % Final   • PLT 08/15/2019 411  140 - 450 K/mcL Final   • NRBC 08/15/2019 0  0 /100 WBC Final   • DIFF TYPE 08/15/2019 AUTOMATED DIFFERENTIAL   Final   • Neutrophil 08/15/2019 70  % Final   • LYMPH 08/15/2019 19  % Final   • MONO  08/15/2019 7  % Final   • EOSIN 08/15/2019 3  % Final   • BASO 08/15/2019 0  % Final   • Percent Immature Granuloctyes 08/15/2019 1  % Final   • Absolute Neutrophil 08/15/2019 8.6* 1.8 - 7.7 K/mcL Final   • Absolute Lymph 08/15/2019 2.4  1.0 - 4.8 K/mcL Final   • Absolute Mono 08/15/2019 0.9  0.3 - 0.9 K/mcL Final   • Absolute Eos 08/15/2019 0.3  0.1 - 0.5 K/mcL Final   • Absolute Baso 08/15/2019 0.0  0.0 - 0.3 K/mcL Final   • Absolute Immature Granulocytes 08/15/2019 0.1  0 - 0.2 K/mcl Final   • Fasting Status 08/15/2019 UNKNOWN  hrs Final   • Sodium 08/15/2019 140  135 - 145 mmol/L Final   • Potassium 08/15/2019 4.2  3.4 - 5.1 mmol/L Final   • Chloride 08/15/2019 104  98 - 107 mmol/L Final   • Carbon Dioxide 08/15/2019 25  21 - 32 mmol/L Final   • Anion Gap 08/15/2019 15  10 - 20 mmol/L Final   • Glucose 08/15/2019 154* 65 - 99 mg/dL Final   • BUN 08/15/2019 13  6 - 20 mg/dL Final   • Creatinine 08/15/2019 0.48* 0.51 - 0.95 mg/dL Final   • GFR Estimate,  08/15/2019 >90   Final   • GFR Estimate, Non  08/15/2019 >90   Final   • BUN/Creatinine Ratio 08/15/2019 27* 7 - 25 Final   • CALCIUM 08/15/2019 9.2  8.4 - 10.2 mg/dL Final   • TOTAL BILIRUBIN 08/15/2019 0.4  0.2 - 1.0 mg/dL Final   • AST/SGOT 08/15/2019 29  <38 Units/L Final   • ALT/SGPT 08/15/2019 28  <64 Units/L Final   • ALK PHOSPHATASE 08/15/2019 69  45 - 117 Units/L Final   • TOTAL PROTEIN 08/15/2019 7.3  6.4 - 8.2 g/dL Final   • Albumin 08/15/2019 3.6  3.6 - 5.1 g/dL Final   • GLOBULIN 08/15/2019 3.7  2.0 - 4.0 g/dL Final   • A/G Ratio, Serum 08/15/2019 1.0  1.0 - 2.4 Final   • FASTING STATUS 08/15/2019 UNKNOWN  hrs Final   • CHOLESTEROL 08/15/2019 111  <200 mg/dL Final   • CALCULATED LDL 08/15/2019 28  <130 mg/dL Final   • HDL 08/15/2019 34* >49 mg/dL Final   • TRIGLYCERIDE 08/15/2019 245* <150 mg/dL Final   • CALCULATED NON HDL 08/15/2019 77  mg/dL Final   • CHOL/HDL 08/15/2019 3.3  <4.5 Final   • Hemoglobin A1C 08/15/2019  6.4* 4.5 - 5.6 % Final       [unfilled]      ASSESSMENT AND PLAN:   This is a 37 year old year-old female who presents with     1. Type 2 diabetes mellitus without complication, without long-term current use of insulin (CMS/[facility])    2. Other iron deficiency anemia    3. Leukopenia, unspecified type    4. Iron deficiency anemia due to chronic blood loss    5. Hyperlipidemia, unspecified hyperlipidemia type    6. Asthma, unspecified asthma severity, unspecified whether complicated, unspecified whether persistent    7. Essential hypertension    8. Irregular menses      Type 2 diabetes mellitus without complication, without long-term current use of insulin (CMS/[facility]):  Ordered labs. Refer to orders.  Continue current management.    Other iron deficiency anemia:  Ordered labs. Refer to orders.  Referred to hematologist. Refer to orders.    Leukopenia, unspecified type:   Ordered labs. Refer to orders.  Referred to hematologist. Refer to orders.    Iron deficiency anemia due to chronic blood loss:   Ordered labs. Refer to orders.  Advised to consult hematologist.  Referred to hematologist. Refer to orders.    Hyperlipidemia, unspecified hyperlipidemia type:  Ordered labs. Refer to orders.    Asthma, unspecified asthma severity, unspecified whether complicated, unspecified whether persistent:  Continue current management.    Essential hypertension:  Reviewed and discussed previous EKG in detail.  Advised to increase the dose of Labetalol.  Refill provided for Labetalol 200 mg twice daily, take as directed. Refer to orders.  Informed that very high blood pressure at other hospital was due to anxiety.    Irregular menses:  Ordered labs. Refer to orders.  Advised to visit Infertility specialist after the blood work have been done.    Follow-up after two months.    Return in about 3 months (around 11/15/2019).    Instructions provided as documented in the AVS.  Medication use,effects and side effects discussed in detail with  patient.  The patient indicated understanding of the diagnosis and agreed with the plan of care.  Medical compliance with plan discussed and risks of non-compliance reviewed.    Patient education completed on disease process, etiology & prognosis.    Patient expresses understanding of the plan.    Proper usage and side effects of medications reviewed & discussed.    Refer to orders.    Return to clinic as clinically indicated as discussed with patient who verbalized understanding of & agreement with the plan.    Entered by Dr. Katherin Sorensen acting as scribe for MD Katherin Ojeda       done

## 2024-04-30 NOTE — BRIEF OPERATIVE NOTE - NSICDXBRIEFPROCEDURE_GEN_ALL_CORE_FT
PROCEDURES:  Robot-assisted laparoscopic total hysterectomy with bilateral salpingectomy and cystoscopy using da Russel Xi 30-Apr-2024 18:46:25  Esha Christopher  Robot-assisted left oophorectomy 30-Apr-2024 18:46:35  Esha Christopher   PROCEDURES:  Robot-assisted laparoscopic total hysterectomy with bilateral salpingectomy and cystoscopy using da Russel Xi 30-Apr-2024 18:46:25  Esha Christopher  Robot-assisted left oophorectomy 30-Apr-2024 18:46:35  Esha Christopher  Robot-assisted laparoscopic excision of endometriosis 02-May-2024 13:00:31  Esha Christopher

## 2024-05-01 PROCEDURE — 58571 TLH W/T/O 250 G OR LESS: CPT | Mod: AS

## 2024-05-02 ENCOUNTER — NON-APPOINTMENT (OUTPATIENT)
Age: 44
End: 2024-05-02

## 2024-05-02 PROBLEM — D21.9 BENIGN NEOPLASM OF CONNECTIVE AND OTHER SOFT TISSUE, UNSPECIFIED: Chronic | Status: ACTIVE | Noted: 2024-04-25

## 2024-05-02 PROBLEM — R14.0 ABDOMINAL DISTENSION (GASEOUS): Chronic | Status: ACTIVE | Noted: 2024-04-25

## 2024-05-02 PROBLEM — D64.9 ANEMIA, UNSPECIFIED: Chronic | Status: ACTIVE | Noted: 2024-04-25

## 2024-05-02 PROBLEM — N20.1 CALCULUS OF URETER: Chronic | Status: ACTIVE | Noted: 2024-04-25

## 2024-05-02 PROBLEM — K59.00 CONSTIPATION, UNSPECIFIED: Chronic | Status: ACTIVE | Noted: 2024-04-25

## 2024-05-02 LAB — HE 4: 27.7 PMOL/L — SIGNIFICANT CHANGE UP (ref 0–63.6)

## 2024-05-08 LAB — SURGICAL PATHOLOGY STUDY: SIGNIFICANT CHANGE UP

## 2024-05-10 LAB — NON-GYNECOLOGICAL CYTOLOGY STUDY: SIGNIFICANT CHANGE UP

## 2024-05-15 ENCOUNTER — APPOINTMENT (OUTPATIENT)
Dept: OBGYN | Facility: CLINIC | Age: 44
End: 2024-05-15
Payer: COMMERCIAL

## 2024-05-15 VITALS
WEIGHT: 137 LBS | DIASTOLIC BLOOD PRESSURE: 70 MMHG | BODY MASS INDEX: 25.86 KG/M2 | SYSTOLIC BLOOD PRESSURE: 120 MMHG | HEIGHT: 61 IN

## 2024-05-15 PROCEDURE — 99024 POSTOP FOLLOW-UP VISIT: CPT

## 2024-05-19 NOTE — HISTORY OF PRESENT ILLNESS
[Pain is well-controlled] : pain is well-controlled [Fever] : no fever [Chills] : no chills [Nausea] : no nausea [Vomiting] : no vomiting [Diarrhea] : no diarrhea [Pelvic Pressure] : no pelvic pressure [Dysuria] : no dysuria [Vaginal Discharge] : no vaginal discharge [Constipation] : no constipation [Clean/Dry/Intact] : clean, dry and intact [Erythema] : not erythematous [Swelling] : not swollen [Dehiscence] : not dehisced [Healed] : not healed [Discharge] : absent of discharge [Mild] : mild vaginal bleeding [Normal] : normal [de-identified] : Pt seen and examined. Feels well. Pain controlled, not using any pain meds. Having regular BM's. No issues with PO intake or urination. Light vaginal spotting only. Still using Iron tabs for preop anemia. Reports less bloating now.  [de-identified] : Port sites x 5 clean/dry/intact, non tender, no hernias, vaginal cuff intact, light spotting noted, well healed, palpably no defects, no discharge, no cellulities or granulation tissue  [TextEntry] : PATH:   Specimen(s) Submitted 1- Left uterosacral legion 2- Uterus,cervix,bilateral fallopian tubes and left ovary  Final Diagnosis 1.   Left uterosacral legion - Endometriosis  2.   Uterus, cervix, bilateral fallopian tubes and left ovary - Left ovary with mature cystic teratoma - Myometrium with leiomyomata - Inactive endometrium - Benign cervix and bilateral fallopian tubes  Uterine weight: 202 grams

## 2024-05-19 NOTE — REASON FOR VISIT
[Post Op Day: ___] : Post-Op Day:  #[unfilled] [Procedure: ___] : Procedure: [unfilled] [Indication: ___] : Indication: [unfilled] [de-identified] : Surgery date: 4/30/2024 [TextEntry] : Operative findings:  Exam under anesthesia demonstrated normal external female genitalia.  Mobile uterus approximately 8 weeks in size with appreciable 4 cm pedunculated fibroid with additional irregularity appreciated on  the right with thickened area in the lower uterine segment and cervix.  Laparoscopic survey demonstrated small area of fibrosis of the liver edge, grossly normal gallbladder, stomach, bowel, and appendix.  Uterus with a 4 cm right pedunculated fibroid,  additional intramural fibroid appreciated.  Right fallopian tube and ovary, normal left ovary with smooth appearance of a 4 cm cyst, removed en bloc and intact.  Left fallopian tube was normal.  Endometriosis implants and fibrosis noted on bilateral uterosacral  ligament and in the rectovaginal space with mild tethering of the rectum.  Cystoscopy done postprocedure with normal bladder mucosa without any evidence of injury or suture material with strong bilateral ureteral jets seen.

## 2024-05-19 NOTE — PLAN
[FreeTextEntry1] : Path,photos reviewed, including benign L ovary, tumor markers preop were normal Reviewed stage 3 endometriosis and role of future suppression given R ovary still intact and potential for future pelvic pain/dyspareunia  Repeat CBC on f/u appt  Cont pelvic rest RTO in 4 weeks

## 2024-06-13 ENCOUNTER — APPOINTMENT (OUTPATIENT)
Dept: OBGYN | Facility: CLINIC | Age: 44
End: 2024-06-13
Payer: COMMERCIAL

## 2024-06-13 DIAGNOSIS — D64.9 ANEMIA, UNSPECIFIED: ICD-10-CM

## 2024-06-13 DIAGNOSIS — O09.529 SUPERVISION OF ELDERLY MULTIGRAVIDA, UNSPECIFIED TRIMESTER: ICD-10-CM

## 2024-06-13 DIAGNOSIS — N93.9 ABNORMAL UTERINE AND VAGINAL BLEEDING, UNSPECIFIED: ICD-10-CM

## 2024-06-13 DIAGNOSIS — D21.9 BENIGN NEOPLASM OF CONNECTIVE AND OTHER SOFT TISSUE, UNSPECIFIED: ICD-10-CM

## 2024-06-13 DIAGNOSIS — B37.31 ACUTE CANDIDIASIS OF VULVA AND VAGINA: ICD-10-CM

## 2024-06-13 DIAGNOSIS — K59.00 CONSTIPATION, UNSPECIFIED: ICD-10-CM

## 2024-06-13 DIAGNOSIS — R14.0 ABDOMINAL DISTENSION (GASEOUS): ICD-10-CM

## 2024-06-13 DIAGNOSIS — Z98.890 OTHER SPECIFIED POSTPROCEDURAL STATES: ICD-10-CM

## 2024-06-13 DIAGNOSIS — D36.9 BENIGN NEOPLASM, UNSPECIFIED SITE: ICD-10-CM

## 2024-06-13 DIAGNOSIS — Z31.9 ENCOUNTER FOR PROCREATIVE MANAGEMENT, UNSPECIFIED: ICD-10-CM

## 2024-06-13 PROCEDURE — 99213 OFFICE O/P EST LOW 20 MIN: CPT | Mod: 24

## 2024-06-13 PROCEDURE — 99024 POSTOP FOLLOW-UP VISIT: CPT

## 2024-06-13 RX ORDER — FERROUS SULFATE TAB EC 325 MG (65 MG FE EQUIVALENT) 325 (65 FE) MG
325 (65 FE) TABLET DELAYED RESPONSE ORAL DAILY
Qty: 30 | Refills: 2 | Status: ACTIVE | COMMUNITY
Start: 2024-06-13 | End: 1900-01-01

## 2024-06-13 RX ORDER — FLUCONAZOLE 150 MG/1
150 TABLET ORAL ONCE
Qty: 2 | Refills: 0 | Status: ACTIVE | COMMUNITY
Start: 2024-06-13 | End: 1900-01-01

## 2024-06-14 LAB
CANDIDA VAG CYTO: NOT DETECTED
G VAGINALIS+PREV SP MTYP VAG QL MICRO: NOT DETECTED
T VAGINALIS VAG QL WET PREP: NOT DETECTED

## 2024-06-22 PROBLEM — O09.529 ADVANCED MATERNAL AGE IN MULTIGRAVIDA: Status: RESOLVED | Noted: 2024-06-13 | Resolved: 2024-06-13

## 2024-06-22 PROBLEM — D64.9 ANEMIA: Status: ACTIVE | Noted: 2024-04-09

## 2024-06-22 PROBLEM — N93.9 ABNORMAL UTERINE BLEEDING: Status: ACTIVE | Noted: 2024-04-09

## 2024-06-22 PROBLEM — D36.9 DERMOID CYST: Status: ACTIVE | Noted: 2024-04-09

## 2024-06-22 PROBLEM — D21.9 LEIOMYOMA: Status: ACTIVE | Noted: 2024-04-09

## 2024-06-22 PROBLEM — R14.0 ABDOMINAL BLOATING: Status: ACTIVE | Noted: 2024-04-04

## 2024-06-22 PROBLEM — Z98.890 POST-OPERATIVE STATE: Status: ACTIVE | Noted: 2024-05-19

## 2024-06-22 PROBLEM — K59.00 CONSTIPATION: Status: ACTIVE | Noted: 2024-04-04

## 2024-06-22 PROBLEM — Z31.9 PATIENT DESIRES PREGNANCY: Status: RESOLVED | Noted: 2024-06-13 | Resolved: 2024-06-13

## 2024-06-22 RX ORDER — TRANEXAMIC ACID 650 MG/1
650 TABLET ORAL EVERY 8 HOURS
Qty: 30 | Refills: 5 | Status: DISCONTINUED | COMMUNITY
Start: 2024-06-13 | End: 2024-06-22

## 2024-06-22 RX ORDER — NORGESTIMATE AND ETHINYL ESTRADIOL 0.25-0.035
0.25-35 KIT ORAL
Refills: 0 | Status: DISCONTINUED | COMMUNITY
End: 2024-06-22

## 2024-06-22 NOTE — REASON FOR VISIT
[Post Op Day: ___] : Post-Op Day:  #[unfilled] [Procedure: ___] : Procedure: [unfilled] [Indication: ___] : Indication: [unfilled] [de-identified] : Surgery date: 4/30/2024 [TextEntry] : Operative findings:  Exam under anesthesia demonstrated normal external female genitalia.  Mobile uterus approximately 8 weeks in size with appreciable 4 cm pedunculated fibroid with additional irregularity appreciated on  the right with thickened area in the lower uterine segment and cervix.  Laparoscopic survey demonstrated small area of fibrosis of the liver edge, grossly normal gallbladder, stomach, bowel, and appendix.  Uterus with a 4 cm right pedunculated fibroid,  additional intramural fibroid appreciated.  Right fallopian tube and ovary, normal left ovary with smooth appearance of a 4 cm cyst, removed en bloc and intact.  Left fallopian tube was normal.  Endometriosis implants and fibrosis noted on bilateral uterosacral  ligament and in the rectovaginal space with mild tethering of the rectum.  Cystoscopy done postprocedure with normal bladder mucosa without any evidence of injury or suture material with strong bilateral ureteral jets seen.

## 2024-06-22 NOTE — HISTORY OF PRESENT ILLNESS
[Pain is well-controlled] : pain is well-controlled [Clean/Dry/Intact] : clean, dry and intact [Mild] : mild vaginal bleeding [Normal] : normal [None] : no vaginal bleeding [Pathology reviewed] : pathology reviewed [Chills] : no chills [Fever] : no fever [Nausea] : no nausea [Vomiting] : no vomiting [Diarrhea] : no diarrhea [Pelvic Pressure] : no pelvic pressure [Dysuria] : no dysuria [Vaginal Discharge] : no vaginal discharge [Constipation] : no constipation [Erythema] : not erythematous [Swelling] : not swollen [Dehiscence] : not dehisced [Healed] : not healed [Discharge] : absent of discharge [de-identified] : Pt seen and examined. Feels well. Pain controlled, not using any pain meds. Having regular BM's. No issues with PO intake or urination. Notes some vaginal irritation ongoing recently without any bleeding. Notes mild discharge only. Has been on pelvic rest.   Recently her daughter went to prom and she was very happy to have that experience.  [de-identified] : Port sites x 5 clean/dry/intact, non tender, no hernias, vaginal cuff intact, erythema noted with slight thicker discharge than usual consistent with candidiasis, well healed, palpably no defects, no cellulities or granulation tissue, non tender [TextEntry] : PATH:   Specimen(s) Submitted 1- Left uterosacral legion 2- Uterus,cervix,bilateral fallopian tubes and left ovary  Final Diagnosis 1.   Left uterosacral legion - Endometriosis  2.   Uterus, cervix, bilateral fallopian tubes and left ovary - Left ovary with mature cystic teratoma - Myometrium with leiomyomata - Inactive endometrium - Benign cervix and bilateral fallopian tubes  Uterine weight: 202 grams

## 2024-06-22 NOTE — PLAN
[FreeTextEntry1] : Rx sent for Diflucan  Vaginitis culture taken Pelvic rest x 2 additional weeks  Ok to resume all remaining activities Cont scheduled f/u with GI for colonoscopy screening, bloating symptoms improved after hysterectomy and continue with bowel aids we previously discussed that she also discussed with GI Return back for routine gyn care to Dr. Palacio Repeat CBC on subsequent PCP/OBGYN appt as preop Hgb 10.2 in office, 8.6 preop, will have patient come in to have it drawn here if she desires also   Reviewed stage 3 endometriosis and role of future suppression given R ovary still intact and potential for future pelvic pain/dyspareunia  - pt will wait and readdress if this causes any issues in the future  She was also made aware regarding risk for ovarian cyst development given dermoid s/p L oophorectomy

## 2024-07-16 ENCOUNTER — TRANSCRIPTION ENCOUNTER (OUTPATIENT)
Age: 44
End: 2024-07-16

## 2024-07-23 NOTE — PATIENT PROFILE ADULT - STATED REASON FOR ADMISSION
Caller: Eva Giraldo    Relationship: Self    Best call back number: [phone]     What is the best time to reach you: ANY    Who are you requesting to speak with (clinical staff, provider,  specific staff member): CLINICAL    Do you know the name of the person who called: SELF    What was the call regarding: WAS SUPPOSED TO HAVE 2 MEDICATIONS CALLED IN AND ONLY 1 HAS BEEN CONFIRMED BY [facility]. PLEASE CALL AND ADVISE ON 2ND     Is it okay if the provider responds through MyChart: CALL BACK     
Left detailed VM that, per Obdulia's comments, the other medication was Voltaren gel, which is available OTC.  Asked her to call if any further questions.  
abdominal pain

## 2025-03-11 NOTE — DISCHARGE NOTE PROVIDER - NSDCQMERRANDS_GEN_ALL_CORE
[] : septum deviated to the right [Midline] : trachea located in midline position [Normal] : no rashes [de-identified] : AU impacted cerumen  No

## (undated) DEVICE — SYR LUER LOK 10CC

## (undated) DEVICE — GOWN XL

## (undated) DEVICE — FOLEY TRAY 16FR 5CC LF UMETER CLOSED

## (undated) DEVICE — XI SYNCHROSEAL VESSEL SEALER 8MM

## (undated) DEVICE — POSITIONER PURPLE ARM ONE STEP (LARGE)

## (undated) DEVICE — XI ARM FORCEP FENESTRATED BIPOLAR 8MM

## (undated) DEVICE — SUT VICRYL 0 27" CT-2 UNDYED

## (undated) DEVICE — XI ARM FORCEP PROGRASP 8MM

## (undated) DEVICE — PACK ROBOTIC LIJ

## (undated) DEVICE — XI ARM FORCEP MARYLAND BIPOLAR

## (undated) DEVICE — GLV 7.5 PROTEXIS

## (undated) DEVICE — TRAP SPECIMEN SPUTUM 40CC

## (undated) DEVICE — XI ARM DISSECTOR CURVED BIPOLAR 8MM

## (undated) DEVICE — IRR BULB PATHFINDER + 10"

## (undated) DEVICE — ELCTR BOVIE TIP BLADE INSULATED 2.75" EDGE

## (undated) DEVICE — ELCTR BOVIE PENCIL SMOKE EVACUATION

## (undated) DEVICE — XI DRAPE COLUMN

## (undated) DEVICE — XI ARM PERMANENT CAUTERY HOOK

## (undated) DEVICE — SUT MONOCRYL 4-0 27" PS-2 UNDYED

## (undated) DEVICE — UTERINE MANIPULATOR DELINEATOR SC 3MM SM

## (undated) DEVICE — XI ARM NEEDLE DRIVER MEGA

## (undated) DEVICE — PACK PERI GYN

## (undated) DEVICE — SYR LUER LOK 50CC

## (undated) DEVICE — DRSG OPSITE 13.75 X 4"

## (undated) DEVICE — XI OBTURATOR OPTICAL BLADELESS 8MM

## (undated) DEVICE — D HELP - CLEARVIEW CLEARIFY SYSTEM

## (undated) DEVICE — PREP BETADINE KIT

## (undated) DEVICE — TUBING AIRSEAL TRI-LUMEN FILTERED

## (undated) DEVICE — ENDOCATCH 10MM SPECIMEN POUCH

## (undated) DEVICE — DRAPE C ARM UNIVERSAL

## (undated) DEVICE — TUBING STRYKEFLOW II SUCTION / IRRIGATOR

## (undated) DEVICE — WRAP COMPRESSION CALF MED

## (undated) DEVICE — XI ARM FORCEP TENACULUM

## (undated) DEVICE — TUBING SET TUR BLADDER IRRIGATION Y-TYPE 81"

## (undated) DEVICE — POSITIONER STRAP ARMBOARD VELCRO TS-30

## (undated) DEVICE — SOL IRR BAG NS 0.9% 3000ML

## (undated) DEVICE — XI TIP COVER

## (undated) DEVICE — POSITIONER FOAM HEAD CRADLE (PINK)

## (undated) DEVICE — BLANKET WARMER UPPER ADULT

## (undated) DEVICE — SUT VLOC 180 0 12" GS-21 GREEN

## (undated) DEVICE — TROCAR SURGIQUEST AIRSEAL 5MM X 100MM

## (undated) DEVICE — SUT VICRYL 0 27" UR-6

## (undated) DEVICE — XI ARM SCISSOR MONO CURVED

## (undated) DEVICE — PACK CYSTO

## (undated) DEVICE — Device

## (undated) DEVICE — DRAPE UNDER BUTTOCKS W SCREEN

## (undated) DEVICE — SOL IRR POUR NS 0.9% 1500ML

## (undated) DEVICE — SI OBTURATOR BLADELESS 8MM

## (undated) DEVICE — SOL IRR BAG NS 0.9% 1000ML

## (undated) DEVICE — XI ARM PERMANENT CAUTERY SPATULA

## (undated) DEVICE — ADAPTER UROLOK

## (undated) DEVICE — SYR LUER LOK 20CC

## (undated) DEVICE — URETEROSCOPE LITHOVUE DISP

## (undated) DEVICE — POSITIONER PINK PAD PIGAZZI SYSTEM

## (undated) DEVICE — XI SEAL UNIVERSIAL 5-12MM

## (undated) DEVICE — GOWN LG

## (undated) DEVICE — XI DRAPE ARM

## (undated) DEVICE — PACK D&C

## (undated) DEVICE — SUT VLOC 180 2-0 12" V-20 GREEN

## (undated) DEVICE — RUMI COLPO-PNEUMO OCCLUDER